# Patient Record
Sex: FEMALE | Race: OTHER | HISPANIC OR LATINO
[De-identification: names, ages, dates, MRNs, and addresses within clinical notes are randomized per-mention and may not be internally consistent; named-entity substitution may affect disease eponyms.]

---

## 2021-06-11 ENCOUNTER — APPOINTMENT (OUTPATIENT)
Dept: INTERNAL MEDICINE | Facility: CLINIC | Age: 61
End: 2021-06-11

## 2022-05-11 ENCOUNTER — RESULT REVIEW (OUTPATIENT)
Age: 62
End: 2022-05-11

## 2022-05-11 ENCOUNTER — APPOINTMENT (OUTPATIENT)
Dept: INTERNAL MEDICINE | Facility: CLINIC | Age: 62
End: 2022-05-11
Payer: COMMERCIAL

## 2022-05-11 ENCOUNTER — OUTPATIENT (OUTPATIENT)
Dept: OUTPATIENT SERVICES | Facility: HOSPITAL | Age: 62
LOS: 1 days | End: 2022-05-11
Payer: SELF-PAY

## 2022-05-11 VITALS
HEART RATE: 109 BPM | OXYGEN SATURATION: 98 % | HEIGHT: 58 IN | WEIGHT: 118 LBS | SYSTOLIC BLOOD PRESSURE: 140 MMHG | BODY MASS INDEX: 24.77 KG/M2 | DIASTOLIC BLOOD PRESSURE: 80 MMHG

## 2022-05-11 DIAGNOSIS — Z80.41 FAMILY HISTORY OF MALIGNANT NEOPLASM OF OVARY: ICD-10-CM

## 2022-05-11 DIAGNOSIS — Z78.9 OTHER SPECIFIED HEALTH STATUS: ICD-10-CM

## 2022-05-11 DIAGNOSIS — I10 ESSENTIAL (PRIMARY) HYPERTENSION: ICD-10-CM

## 2022-05-11 PROCEDURE — ZZZZZ: CPT

## 2022-05-12 NOTE — HISTORY OF PRESENT ILLNESS
[Time Spent: ____ minutes] : Total time spent using  services: [unfilled] minutes. The patient's primary language is not English thus required  services. [FreeTextEntry1] : CPE, new patient [Interpreters_IDNumber] : [id number] [de-identified] : 61 year old female, new to our clinic, presenting for CPE. \par \par #Cough:                   \par -history of multiple "lung infections". Feels wheezy at night, a lot of dry coughing for about 3 weeks.  Most recently went to [facility] last week, told she has bronchitis, given albuterol inhaler, prednisone and Flonase which helped but she ran out yesterday and the cough returned. Has also tried allegra and zyrtec but stopped because felt they didn't help- tried for 10 days each. Also endorsing red itchy eyes with discharge.                    \par PSH: bunion removal, varicose veins removal\par Meds: glucosamine, vitamins, collagen \par FH: mother passed away of ovarian cancer \par Social: \par -Came from [place] 3 years ago\par -works as . \par -lives with daughter everything good at home \par \par PHQ2=0, denies any depression or anxiety \par \par #HCM\par -Mammogram: 10 years ago, was normal \par -Pap:does not remember ever having \par -Covid: vaccinated x3\par -Tdap: unsure if she ever got. \par Does not have record of childhood vaccinates but believes she had them as child a [place].

## 2022-05-12 NOTE — END OF VISIT
[] : Resident [FreeTextEntry3] : 61yop F with PMHx of "recurrent lung infection" who presents to establish care. Developed allergic-type symptoms followed by cough. Was seen at an urgent Care and given albuterol inhaler and prednisone burst with some improvement, however has finished prednisone and has a persistent cough. Exam notable for CTAB, although note brief wheeze at the end of coughing. Suspect reactive airway disease, cont albuterol. Agree with pulm evaluation. BP high on this visit but actively coughing on repeat. Recommend close follow-up for BP

## 2022-05-12 NOTE — REVIEW OF SYSTEMS
[Redness] : redness [Itching] : itching [Nasal Discharge] : nasal discharge [Postnasal Drip] : postnasal drip [Wheezing] : wheezing [Cough] : cough [Negative] : Heme/Lymph [Vision Problems] : no vision problems [Earache] : no earache [Hearing Loss] : no hearing loss [Sore Throat] : no sore throat [Shortness Of Breath] : no shortness of breath [Dyspnea on Exertion] : no dyspnea on exertion

## 2022-05-12 NOTE — PLAN
[FreeTextEntry1] : 61 year old female, no significant PMH, presenting as new patient for CPE. \par \par #Cough\par -lungs clear to auscultation, satting well on RA. Likely post-nasal drip/ allergy induced vs reactive airway disease\par -trial of claritin \par -spacer prescribed for albuterol inhaler \par -Mariferseshawn Haque, continue with flonase PRN \par -pulm referral given history of recurrent "lung infections" in [place], may benefit from PFT testing for asthma/copd \par \par #HCM\par -mammogram referral\par -ob/gyn referral for pap \par -FIT test provided\par -BP elevated, repeat 130/80, however, patient coughing and distressed. Will repeat BP check at next visit, consider starting medications if remains elevated. \par -Covid vaccinated x3\par -CBC, CMP, A1C, lipids, Hep C\par -Tdap deferred this visit, will give next visit \par \par RTC in 5 weeks

## 2022-05-12 NOTE — HEALTH RISK ASSESSMENT
[Good] : ~his/her~  mood as  good [Never] : Never [0-4] : 0-4 [No] : No [Never (0 pts)] : Never (0 points) [0] : 2) Feeling down, depressed, or hopeless: Not at all (0) [PHQ-2 Negative - No further assessment needed] : PHQ-2 Negative - No further assessment needed [Patient reported mammogram was normal] : Patient reported mammogram was normal [Patient reported colonoscopy was normal] : Patient reported colonoscopy was normal [With Family] : lives with family [Employed] : employed [Audit-CScore] : 0 [[id number]] : 0 [MammogramDate] : 2010 [ColonoscopyDate] : 2010 [FreeTextEntry2] :

## 2022-05-13 LAB
ALBUMIN SERPL ELPH-MCNC: 4.7 G/DL
ALP BLD-CCNC: 86 U/L
ALT SERPL-CCNC: 10 U/L
ANION GAP SERPL CALC-SCNC: 12 MMOL/L
AST SERPL-CCNC: 28 U/L
BASOPHILS # BLD AUTO: 0.08 K/UL
BASOPHILS NFR BLD AUTO: 1 %
BILIRUB SERPL-MCNC: 0.4 MG/DL
BUN SERPL-MCNC: 17 MG/DL
CALCIUM SERPL-MCNC: 9.6 MG/DL
CHLORIDE SERPL-SCNC: 105 MMOL/L
CHOLEST SERPL-MCNC: 204 MG/DL
CO2 SERPL-SCNC: 26 MMOL/L
CREAT SERPL-MCNC: 0.61 MG/DL
EGFR: 102 ML/MIN/1.73M2
EOSINOPHIL # BLD AUTO: 0.79 K/UL
EOSINOPHIL NFR BLD AUTO: 10.2 %
ESTIMATED AVERAGE GLUCOSE: 114 MG/DL
GLUCOSE SERPL-MCNC: 101 MG/DL
HBA1C MFR BLD HPLC: 5.6 %
HCT VFR BLD CALC: 40.4 %
HCV AB SER QL: NONREACTIVE
HCV S/CO RATIO: 0.07 S/CO
HDLC SERPL-MCNC: 89 MG/DL
HGB BLD-MCNC: 13 G/DL
IMM GRANULOCYTES NFR BLD AUTO: 0.5 %
LDLC SERPL CALC-MCNC: 81 MG/DL
LYMPHOCYTES # BLD AUTO: 3.15 K/UL
LYMPHOCYTES NFR BLD AUTO: 40.9 %
MAN DIFF?: NORMAL
MCHC RBC-ENTMCNC: 31 PG
MCHC RBC-ENTMCNC: 32.2 GM/DL
MCV RBC AUTO: 96.2 FL
MONOCYTES # BLD AUTO: 0.69 K/UL
MONOCYTES NFR BLD AUTO: 8.9 %
NEUTROPHILS # BLD AUTO: 2.96 K/UL
NEUTROPHILS NFR BLD AUTO: 38.5 %
NONHDLC SERPL-MCNC: 114 MG/DL
PLATELET # BLD AUTO: 263 K/UL
POTASSIUM SERPL-SCNC: 4.4 MMOL/L
PROT SERPL-MCNC: 6.8 G/DL
RBC # BLD: 4.2 M/UL
RBC # FLD: 13.2 %
SODIUM SERPL-SCNC: 143 MMOL/L
TRIGL SERPL-MCNC: 169 MG/DL
TSH SERPL-ACNC: 3.33 UIU/ML
WBC # FLD AUTO: 7.71 K/UL

## 2022-05-13 PROCEDURE — T1013: CPT

## 2022-05-13 PROCEDURE — 82274 ASSAY TEST FOR BLOOD FECAL: CPT

## 2022-05-13 PROCEDURE — 84443 ASSAY THYROID STIM HORMONE: CPT

## 2022-05-13 PROCEDURE — 86803 HEPATITIS C AB TEST: CPT

## 2022-05-13 PROCEDURE — 80053 COMPREHEN METABOLIC PANEL: CPT

## 2022-05-13 PROCEDURE — 85025 COMPLETE CBC W/AUTO DIFF WBC: CPT

## 2022-05-13 PROCEDURE — 80061 LIPID PANEL: CPT

## 2022-05-13 PROCEDURE — 83036 HEMOGLOBIN GLYCOSYLATED A1C: CPT

## 2022-05-13 PROCEDURE — G0463: CPT

## 2022-05-16 LAB — HEMOCCULT STL QL IA: NEGATIVE

## 2022-05-17 DIAGNOSIS — H10.10 ACUTE ATOPIC CONJUNCTIVITIS, UNSPECIFIED EYE: ICD-10-CM

## 2022-05-17 DIAGNOSIS — R05.9 COUGH, UNSPECIFIED: ICD-10-CM

## 2022-05-17 DIAGNOSIS — Z78.9 OTHER SPECIFIED HEALTH STATUS: ICD-10-CM

## 2022-05-17 DIAGNOSIS — Z80.41 FAMILY HISTORY OF MALIGNANT NEOPLASM OF OVARY: ICD-10-CM

## 2022-05-17 DIAGNOSIS — Z00.00 ENCOUNTER FOR GENERAL ADULT MEDICAL EXAMINATION WITHOUT ABNORMAL FINDINGS: ICD-10-CM

## 2022-05-18 ENCOUNTER — OUTPATIENT (OUTPATIENT)
Dept: OUTPATIENT SERVICES | Facility: HOSPITAL | Age: 62
LOS: 1 days | End: 2022-05-18
Payer: SELF-PAY

## 2022-05-18 ENCOUNTER — APPOINTMENT (OUTPATIENT)
Dept: INTERNAL MEDICINE | Facility: CLINIC | Age: 62
End: 2022-05-18
Payer: COMMERCIAL

## 2022-05-18 VITALS
TEMPERATURE: 98.7 F | OXYGEN SATURATION: 98 % | HEIGHT: 58 IN | SYSTOLIC BLOOD PRESSURE: 150 MMHG | HEART RATE: 115 BPM | DIASTOLIC BLOOD PRESSURE: 90 MMHG | BODY MASS INDEX: 24.14 KG/M2 | WEIGHT: 115 LBS

## 2022-05-18 DIAGNOSIS — I10 ESSENTIAL (PRIMARY) HYPERTENSION: ICD-10-CM

## 2022-05-18 PROCEDURE — G0463: CPT

## 2022-05-18 PROCEDURE — ZZZZZ: CPT

## 2022-05-19 NOTE — REVIEW OF SYSTEMS
[Fatigue] : fatigue [Redness] : redness [Shortness Of Breath] : shortness of breath [Wheezing] : wheezing [Cough] : cough [Negative] : Neurological

## 2022-05-20 NOTE — END OF VISIT
[] : Resident [FreeTextEntry3] : 62yo F who presents for recurrent cough and worsened sx since trialing allergic rhinitis tx and cough suppressant last week. Seen last week with reassuring lung exam, resident reports this week is diffusely wheezy again. Agree with restarting prednisone, favor longer taper given severeity of sx and failure of burst. Will trial ICS-LABA BID but if unable to obtain due to cost, can consider [facility]elukast through [facility]

## 2022-05-20 NOTE — HISTORY OF PRESENT ILLNESS
[FreeTextEntry8] :  [id number] used for patient interaction.\par \par 62 y/o F w/ h/o multiple“lung infections”, seen last week for likely asthma exacerbation presents for continued symptoms.\par \par Symptoms initially started 3 weeks ago w/ dry cough and wheezing. Seen at urgent care and given course of prednisone. Patient states that the steroids helped her symptoms, but did not resolve the symptoms. After 5 day 40mg prednisone course finished, symptoms worsened. \par \par Seen last week at clinic for cough, wheezing, shortness of breath after being seen and given claritin, albuterol w/ spacer, tesselon perles, flonase. S/p course of prednisone from urgent care. Additionally referred to pulmonology, next available pulm appt in July which patient has scheduled.\par   \par Currently patient uses inhaler every 4 hours. Relives symptoms for a few hours before she becomes “suffocated”. Significant coughing with "chest whistling" at night where patient states her chest becomes "blocked." These symptoms prevent her from sleeping and she has been significantly fatigued since her symptoms have started.\par \par Bronchitis/lung infection diagnosed by urgent care in past, given antibioticsfor infection in the last year and symptoms resolved completely after treatment.\par \par Has tried allegra and zyrtec but stopped as they did not help. \par \par Also complaining of some heartburn which she has been taking OTC esomeprazole, helps symptoms.

## 2022-05-20 NOTE — PHYSICAL EXAM
[Normal] : soft, non-tender, non-distended, no masses palpated, no HSM and normal bowel sounds [de-identified] : b/l mild conjunctivl erythema and tearing [de-identified] : diffuse inspiratory/expiratory wheezing, mildly increased WOB, frequent dry cough [de-identified] : tachycardia, no Murmurs, rubs, gallops

## 2022-05-20 NOTE — ASSESSMENT
[FreeTextEntry1] : 60 y/o F w/ h/o multiple“lung infections”, seen last week for likely asthma exacerbation presents for continued symptoms.

## 2022-05-21 ENCOUNTER — OUTPATIENT (OUTPATIENT)
Dept: OUTPATIENT SERVICES | Facility: HOSPITAL | Age: 62
LOS: 1 days | End: 2022-05-21
Payer: SELF-PAY

## 2022-05-21 ENCOUNTER — RESULT REVIEW (OUTPATIENT)
Age: 62
End: 2022-05-21

## 2022-05-21 ENCOUNTER — APPOINTMENT (OUTPATIENT)
Dept: MAMMOGRAPHY | Facility: IMAGING CENTER | Age: 62
End: 2022-05-21
Payer: COMMERCIAL

## 2022-05-21 DIAGNOSIS — Z00.8 ENCOUNTER FOR OTHER GENERAL EXAMINATION: ICD-10-CM

## 2022-05-21 PROCEDURE — 77063 BREAST TOMOSYNTHESIS BI: CPT | Mod: 26

## 2022-05-21 PROCEDURE — 77067 SCR MAMMO BI INCL CAD: CPT

## 2022-05-21 PROCEDURE — 77067 SCR MAMMO BI INCL CAD: CPT | Mod: 26

## 2022-05-21 PROCEDURE — 77063 BREAST TOMOSYNTHESIS BI: CPT

## 2022-05-26 ENCOUNTER — NON-APPOINTMENT (OUTPATIENT)
Age: 62
End: 2022-05-26

## 2022-05-26 DIAGNOSIS — H10.10 ACUTE ATOPIC CONJUNCTIVITIS, UNSPECIFIED EYE: ICD-10-CM

## 2022-05-26 DIAGNOSIS — R05.9 COUGH, UNSPECIFIED: ICD-10-CM

## 2022-06-14 ENCOUNTER — OUTPATIENT (OUTPATIENT)
Dept: OUTPATIENT SERVICES | Facility: HOSPITAL | Age: 62
LOS: 1 days | End: 2022-06-14
Payer: SELF-PAY

## 2022-06-14 ENCOUNTER — APPOINTMENT (OUTPATIENT)
Dept: INTERNAL MEDICINE | Facility: CLINIC | Age: 62
End: 2022-06-14
Payer: COMMERCIAL

## 2022-06-14 VITALS
WEIGHT: 119 LBS | SYSTOLIC BLOOD PRESSURE: 128 MMHG | OXYGEN SATURATION: 98 % | BODY MASS INDEX: 24.98 KG/M2 | HEART RATE: 82 BPM | DIASTOLIC BLOOD PRESSURE: 78 MMHG | HEIGHT: 58 IN

## 2022-06-14 DIAGNOSIS — I10 ESSENTIAL (PRIMARY) HYPERTENSION: ICD-10-CM

## 2022-06-14 DIAGNOSIS — H10.10 ACUTE ATOPIC CONJUNCTIVITIS, UNSPECIFIED EYE: ICD-10-CM

## 2022-06-14 PROCEDURE — T1013: CPT

## 2022-06-14 PROCEDURE — G0463: CPT | Mod: 25

## 2022-06-14 PROCEDURE — ZZZZZ: CPT

## 2022-06-17 ENCOUNTER — OUTPATIENT (OUTPATIENT)
Dept: OUTPATIENT SERVICES | Facility: HOSPITAL | Age: 62
LOS: 1 days | End: 2022-06-17
Payer: SELF-PAY

## 2022-06-17 ENCOUNTER — RESULT REVIEW (OUTPATIENT)
Age: 62
End: 2022-06-17

## 2022-06-17 ENCOUNTER — APPOINTMENT (OUTPATIENT)
Dept: MAMMOGRAPHY | Facility: IMAGING CENTER | Age: 62
End: 2022-06-17
Payer: COMMERCIAL

## 2022-06-17 ENCOUNTER — APPOINTMENT (OUTPATIENT)
Dept: ULTRASOUND IMAGING | Facility: IMAGING CENTER | Age: 62
End: 2022-06-17
Payer: COMMERCIAL

## 2022-06-17 DIAGNOSIS — Z00.8 ENCOUNTER FOR OTHER GENERAL EXAMINATION: ICD-10-CM

## 2022-06-17 DIAGNOSIS — R92.8 OTHER ABNORMAL AND INCONCLUSIVE FINDINGS ON DIAGNOSTIC IMAGING OF BREAST: ICD-10-CM

## 2022-06-17 PROCEDURE — 76642 ULTRASOUND BREAST LIMITED: CPT

## 2022-06-17 PROCEDURE — G0279: CPT

## 2022-06-17 PROCEDURE — 76642 ULTRASOUND BREAST LIMITED: CPT | Mod: 26,LT

## 2022-06-17 PROCEDURE — 77065 DX MAMMO INCL CAD UNI: CPT | Mod: 26,LT

## 2022-06-17 PROCEDURE — 77065 DX MAMMO INCL CAD UNI: CPT

## 2022-06-17 PROCEDURE — G0279: CPT | Mod: 26

## 2022-06-17 NOTE — HISTORY OF PRESENT ILLNESS
[[employer] ] : provided by [employer]   [Time Spent: ____ minutes] : Total time spent using  services: [unfilled] minutes. The patient's primary language is not English thus required  services. [FreeTextEntry1] : FU [Interpreters_IDNumber] : [id number] [de-identified] : Ms. Solares is a 61 year old female presenting for FU. I last saw her in May, at that time she was endorsing history of "multiple lung infections" in the past. She was started on claritin, albuterol, tessalon and flonase at the time. Her symptoms persisted, and she was started on a steroid taper in May 18th, as well as fluticasone salmeterol. \par \par Today, she is feeling a lot better. Today is last day of steroid taper. She has been using the new inhaler twice a day, feels that it is really helping. Ran out of albuterol awhile ago. Has also been taking the loratidine. No SOB, cough, fevers, chest pain.

## 2022-06-17 NOTE — ASSESSMENT
[FreeTextEntry1] : 61 year old female presenting for FU. \par \par #Cough\par -much improved with inhalers, steroids and loratidine. Today is last day of steroid taper \par -has pulm appointment in July, will need PFTs for reactive airway disease \par -continue albuterol and advair- will task pharmacist about obtaining less expensive steroid/LABA as costing patient >100 dollars.

## 2022-06-20 ENCOUNTER — LABORATORY RESULT (OUTPATIENT)
Age: 62
End: 2022-06-20

## 2022-06-20 ENCOUNTER — OUTPATIENT (OUTPATIENT)
Dept: OUTPATIENT SERVICES | Facility: HOSPITAL | Age: 62
LOS: 1 days | End: 2022-06-20
Payer: SELF-PAY

## 2022-06-20 ENCOUNTER — APPOINTMENT (OUTPATIENT)
Dept: OBGYN | Facility: CLINIC | Age: 62
End: 2022-06-20
Payer: COMMERCIAL

## 2022-06-20 VITALS
WEIGHT: 122.13 LBS | BODY MASS INDEX: 25.52 KG/M2 | SYSTOLIC BLOOD PRESSURE: 142 MMHG | DIASTOLIC BLOOD PRESSURE: 78 MMHG

## 2022-06-20 DIAGNOSIS — N76.0 ACUTE VAGINITIS: ICD-10-CM

## 2022-06-20 DIAGNOSIS — Z12.9 ENCOUNTER FOR SCREENING FOR MALIGNANT NEOPLASM, SITE UNSPECIFIED: ICD-10-CM

## 2022-06-20 DIAGNOSIS — Z01.419 ENCOUNTER FOR GYNECOLOGICAL EXAMINATION (GENERAL) (ROUTINE) W/OUT ABNORMAL FINDINGS: ICD-10-CM

## 2022-06-20 DIAGNOSIS — R10.2 PELVIC AND PERINEAL PAIN: ICD-10-CM

## 2022-06-20 PROCEDURE — 87625 HPV TYPES 16 & 18 ONLY: CPT

## 2022-06-20 PROCEDURE — 87624 HPV HI-RISK TYP POOLED RSLT: CPT

## 2022-06-20 PROCEDURE — T1013: CPT

## 2022-06-20 PROCEDURE — 99203 OFFICE O/P NEW LOW 30 MIN: CPT | Mod: 25

## 2022-06-20 NOTE — REASON FOR VISIT
[Initial] : an initial consultation for [[employer] ] : provided by [employer]   [Time Spent: ____ minutes] : Total time spent using  services: [unfilled] minutes. The patient's primary language is not English thus required  services. [Interpreters_IDNumber] : [id number]

## 2022-06-20 NOTE — PHYSICAL EXAM
[Appropriately responsive] : appropriately responsive [Alert] : alert [No Acute Distress] : no acute distress [No Lymphadenopathy] : no lymphadenopathy [No Murmurs] : no murmurs [Soft] : soft [Non-tender] : non-tender [Non-distended] : non-distended [No HSM] : No HSM [No Lesions] : no lesions [No Mass] : no mass [Oriented x3] : oriented x3 [Examination Of The Breasts] : a normal appearance [No Masses] : no breast masses were palpable [Vulvar Atrophy] : vulvar atrophy [Labia Majora] : normal [Labia Minora] : normal [Atrophy] : atrophy [No Bleeding] : There was no active vaginal bleeding [Normal] : normal [Tenderness] : nontender [Enlarged ___ wks] : not enlarged [Uterine Adnexae] : non-palpable

## 2022-06-20 NOTE — HISTORY OF PRESENT ILLNESS
[FreeTextEntry1] : INT# [id number] \par 62yo  (LMP 49) presents as new pt to establish care. Denies PMB/pain. change in bowel/bladder habits.  \par Pt is not sexually active.  Denies stress incontinence.  + vague bloating/ lower abdominal pain. \par Pt had recent mammo-  requiring biopsy, scheduled for Friday per pt. \par \par Obhx: 3x ,  1 x sab (D&C)\par Gynhx: denies abnl paps/ stds/cysts/fibroids. Last pap:  ??  Mammo: 2022 Bir[facility] 4A (pt has bx scheduled)\par Pmhx: chronic cough? - following up with Pulmonology in July\par Shx: bunion removal, varicose veins removal, D&C\par Meds: glucosamine, vitamins, collagen \par Famhx: mother passed away of ovarian cancer (50s), brother passed away throat cancer (60s)\par Sochx: +smoking hx (4yr hx in teenage hx) denies etoh.drugs, denies dv or abuse issues\par Lives with daughter.  Immigrant from [place] x 3 yrs. \par \par Gen health followed by Medicine. \par - Colonoscopy: ([facility])- 42yo.  \par - COVID:

## 2022-06-20 NOTE — DISCUSSION/SUMMARY
[FreeTextEntry1] : 62yo P3-  new pt / annual exam\par \par # bloating/ vague pelvic pain\par - in setting of fam h/o ovarian cancer  [ ]pelvic sono ordered\par - reviewed fam hx [ ]medical gentics referral\par \par # HCM\par - []pap/ hpv collected\par - [facility] 4 mammo- [ ]pt has biopsy schedule \par - GI referral given for screening colonoscopy\par - gen health followed by medicine\par \par RTC for lubna/prn\par Will call with sono result\par Malini Salazar, PAC

## 2022-06-21 LAB
HPV GENOTYPE 16: SIGNIFICANT CHANGE UP
HPV GENOTYPE 18/45: SIGNIFICANT CHANGE UP
HPV HIGH+LOW RISK DNA PNL CVX: DETECTED

## 2022-06-22 DIAGNOSIS — R05.9 COUGH, UNSPECIFIED: ICD-10-CM

## 2022-06-23 LAB — CYTOLOGY SPEC DOC CYTO: SIGNIFICANT CHANGE UP

## 2022-06-24 ENCOUNTER — RESULT REVIEW (OUTPATIENT)
Age: 62
End: 2022-06-24

## 2022-06-24 ENCOUNTER — OUTPATIENT (OUTPATIENT)
Dept: OUTPATIENT SERVICES | Facility: HOSPITAL | Age: 62
LOS: 1 days | End: 2022-06-24
Payer: SELF-PAY

## 2022-06-24 ENCOUNTER — APPOINTMENT (OUTPATIENT)
Dept: ULTRASOUND IMAGING | Facility: IMAGING CENTER | Age: 62
End: 2022-06-24
Payer: COMMERCIAL

## 2022-06-24 DIAGNOSIS — R92.8 OTHER ABNORMAL AND INCONCLUSIVE FINDINGS ON DIAGNOSTIC IMAGING OF BREAST: ICD-10-CM

## 2022-06-24 PROCEDURE — A4648: CPT

## 2022-06-24 PROCEDURE — 19083 BX BREAST 1ST LESION US IMAG: CPT

## 2022-06-24 PROCEDURE — 88305 TISSUE EXAM BY PATHOLOGIST: CPT | Mod: 26

## 2022-06-24 PROCEDURE — 77065 DX MAMMO INCL CAD UNI: CPT | Mod: 26,LT

## 2022-06-24 PROCEDURE — 77065 DX MAMMO INCL CAD UNI: CPT

## 2022-06-24 PROCEDURE — 88305 TISSUE EXAM BY PATHOLOGIST: CPT

## 2022-06-24 PROCEDURE — 19083 BX BREAST 1ST LESION US IMAG: CPT | Mod: LT

## 2022-06-28 DIAGNOSIS — Z01.419 ENCOUNTER FOR GYNECOLOGICAL EXAMINATION (GENERAL) (ROUTINE) WITHOUT ABNORMAL FINDINGS: ICD-10-CM

## 2022-06-28 DIAGNOSIS — Z12.9 ENCOUNTER FOR SCREENING FOR MALIGNANT NEOPLASM, SITE UNSPECIFIED: ICD-10-CM

## 2022-06-28 DIAGNOSIS — R10.2 PELVIC AND PERINEAL PAIN: ICD-10-CM

## 2022-07-01 ENCOUNTER — NON-APPOINTMENT (OUTPATIENT)
Age: 62
End: 2022-07-01

## 2022-07-03 ENCOUNTER — NON-APPOINTMENT (OUTPATIENT)
Age: 62
End: 2022-07-03

## 2022-07-06 ENCOUNTER — APPOINTMENT (OUTPATIENT)
Dept: PULMONOLOGY | Facility: CLINIC | Age: 62
End: 2022-07-06

## 2022-07-06 VITALS
HEART RATE: 67 BPM | BODY MASS INDEX: 24.39 KG/M2 | TEMPERATURE: 98.5 F | WEIGHT: 121 LBS | OXYGEN SATURATION: 99 % | DIASTOLIC BLOOD PRESSURE: 78 MMHG | SYSTOLIC BLOOD PRESSURE: 132 MMHG | HEIGHT: 59 IN

## 2022-07-06 PROCEDURE — 94729 DIFFUSING CAPACITY: CPT

## 2022-07-06 PROCEDURE — 99202 OFFICE O/P NEW SF 15 MIN: CPT | Mod: 25

## 2022-07-06 PROCEDURE — ZZZZZ: CPT

## 2022-07-06 PROCEDURE — 94726 PLETHYSMOGRAPHY LUNG VOLUMES: CPT

## 2022-07-06 PROCEDURE — 94010 BREATHING CAPACITY TEST: CPT

## 2022-07-06 NOTE — ASSESSMENT
[FreeTextEntry1] : the patient reports that she had a chest radiograph which she's not certain of the results. She did have a mild eosinophilia when she was seen in May. Her lung functions show very mild obstructive airways disease but because we did not know that she had possible history of asthma and she did not bring her bronchodilator inhaler, no test for bronchodilatation was performed.\par . Apparently the cost of inhalers is prohibitive for her. I told her that if any of her symptoms were to return, wheezing, coughing or shortness of breath, she should contact me and I would be happy to provide her with medication samples.\par

## 2022-07-06 NOTE — HISTORY OF PRESENT ILLNESS
[TextBox_4] : 61-year-old female not certain why she was being seen by a pulmonary physician. I was helped during the interview by a  who spoke Sierra Leonean. The patient reports that she was sick about a month ago with a respiratory ailment that she thought was bronchitis but now she is better. She failed to mention that she had been taking inhaled Advair and albuterol and was given a course of corticosteroids. Currently she is not taking any medications since she can't afford them. She has a history of having had Covid about a year ago. She was born in [place] and denies any prior history of pulmonary disease.. The patient works as a  and she denies any smoking history

## 2022-07-09 ENCOUNTER — APPOINTMENT (OUTPATIENT)
Dept: RADIOLOGY | Facility: CLINIC | Age: 62
End: 2022-07-09

## 2022-07-09 ENCOUNTER — OUTPATIENT (OUTPATIENT)
Dept: OUTPATIENT SERVICES | Facility: HOSPITAL | Age: 62
LOS: 1 days | End: 2022-07-09
Payer: SELF-PAY

## 2022-07-09 DIAGNOSIS — J18.9 PNEUMONIA, UNSPECIFIED ORGANISM: ICD-10-CM

## 2022-07-09 DIAGNOSIS — Z12.9 ENCOUNTER FOR SCREENING FOR MALIGNANT NEOPLASM, SITE UNSPECIFIED: ICD-10-CM

## 2022-07-09 DIAGNOSIS — Z01.419 ENCOUNTER FOR GYNECOLOGICAL EXAMINATION (GENERAL) (ROUTINE) WITHOUT ABNORMAL FINDINGS: ICD-10-CM

## 2022-07-09 DIAGNOSIS — R10.2 PELVIC AND PERINEAL PAIN: ICD-10-CM

## 2022-07-09 PROCEDURE — 71046 X-RAY EXAM CHEST 2 VIEWS: CPT

## 2022-07-09 PROCEDURE — 71046 X-RAY EXAM CHEST 2 VIEWS: CPT | Mod: 26

## 2022-08-01 ENCOUNTER — NON-APPOINTMENT (OUTPATIENT)
Age: 62
End: 2022-08-01

## 2022-08-03 ENCOUNTER — APPOINTMENT (OUTPATIENT)
Dept: PULMONOLOGY | Facility: CLINIC | Age: 62
End: 2022-08-03

## 2022-08-03 VITALS
TEMPERATURE: 98 F | WEIGHT: 121 LBS | HEART RATE: 65 BPM | HEIGHT: 59 IN | RESPIRATION RATE: 15 BRPM | BODY MASS INDEX: 24.39 KG/M2 | SYSTOLIC BLOOD PRESSURE: 146 MMHG | OXYGEN SATURATION: 95 % | DIASTOLIC BLOOD PRESSURE: 71 MMHG

## 2022-08-03 PROCEDURE — 99213 OFFICE O/P EST LOW 20 MIN: CPT | Mod: GC

## 2022-08-03 NOTE — ASSESSMENT
[FreeTextEntry1] : 60 yo F with PMH of cough and ?prior history of asthma who presents for evaluation of prior cough. Patient has been doing well off of inhaler therapy, prior CXR and PFTs unremarkable without cough or other respiratory symptoms at this time.\par \par Plan:\par - no need for inhaler therapy at this time\par - advised patient to return to pulmonary office if she has any further cough, shortness of breath, or wheezing, we will provide medication samples\par \par \par \par Follow up as needed.

## 2022-08-03 NOTE — PHYSICAL EXAM
[No Acute Distress] : no acute distress [Normal Oropharynx] : normal oropharynx [Normal Appearance] : normal appearance [No Neck Mass] : no neck mass [Normal Rate/Rhythm] : normal rate/rhythm [Normal S1, S2] : normal s1, s2 [No Murmurs] : no murmurs [No Resp Distress] : no resp distress [Clear to Auscultation Bilaterally] : clear to auscultation bilaterally [No Abnormalities] : no abnormalities [Normal Gait] : normal gait [No Clubbing] : no clubbing [No Cyanosis] : no cyanosis [No Edema] : no edema [FROM] : FROM [Normal Color/ Pigmentation] : normal color/ pigmentation [No Focal Deficits] : no focal deficits [Normal Affect] : normal affect [Oriented x3] : oriented x3

## 2022-08-03 NOTE — HISTORY OF PRESENT ILLNESS
[Former] : former [TextBox_4] : [employer]  Cuban: [id number]\par \par 61 year old female with history of chronic cough and possible history of asthma who presents for follow up. She was last seen by pulmonary about 1 month ago. At that time, she was not sure why she was seeing a pulmonologist. Reports that since last visit, she had an upper respiratory infection but she has no issues with shortness of breath and did not need any inhaler therapy (in may 2022 she was prescribed prednisone and symbicort for wheezing while she had a URI). She has not been able to use the inhaler prescribed by her primary doctor because it is not covered by her insurance. Currently, she has no cough, shortness of breath, wheezing, chest pain, chest tightness, acid reflux. No recent travel or sick contacts.\par  [TextBox_11] : 1/3 [TextBox_13] : 7 [YearQuit] : 1980

## 2022-08-22 ENCOUNTER — OUTPATIENT (OUTPATIENT)
Dept: OUTPATIENT SERVICES | Facility: HOSPITAL | Age: 62
LOS: 1 days | Discharge: HOME | End: 2022-08-22

## 2022-08-22 DIAGNOSIS — Z15.89 GENETIC SUSCEPTIBILITY TO OTHER DISEASE: ICD-10-CM

## 2022-08-24 ENCOUNTER — LABORATORY RESULT (OUTPATIENT)
Age: 62
End: 2022-08-24

## 2022-08-24 ENCOUNTER — APPOINTMENT (OUTPATIENT)
Dept: HEMATOLOGY ONCOLOGY | Facility: CLINIC | Age: 62
End: 2022-08-24

## 2022-08-30 NOTE — DISCUSSION/SUMMARY
[FreeTextEntry1] : REASON FOR CONSULT\par Desire Solares is a 61-year-old female who was referred by Dr. Jon Duron for cancer genetic counseling and risk assessment due to a family history of ovarian cancer. This consultation was carried out with the aid of a  #[id number] and [id number]. \par \par RELEVANT MEDICAL HISTORY\par Ms. Solares is a healthy individual who has never had cancer. She has a family history of cancer, see below.\par \par OTHER MEDICAL AND SURGICAL HISTORY:\par Arthritis. Vein surgery. Bunions. Left breast biopsy – breast tissue with fibroadenomatoid change (2022). \par \par PAST OB/GYN HISTORY:\par Obstetrical History: \par Age at Menarche: 15\par Menopausal with LMP at age 53\par Age at First Live Birth: 20\par Oral Contraceptive Use: No\par Hormone Replacement Therapy: No\par \par CANCER SCREENING HISTORY:  \par Breast: Last mammogram and sonogram 2022, scattered areas of fibroglandular density and left breast mass, needle biopsy pursued, see above for results. Frequency: yearly. \par GYN: Last visit 3 months ago, normal. Frequency: yearly. \par Colon: Last colonoscopy 10 years ago, no polyps reported.\par Skin: None. \par \par SOCIAL HISTORY:\par •	Tobacco-product use: Yes, formerly, 3 cigarettes per day for 2 years. \par \par FAMILY HISTORY:\par Maternal and paternal ancestry was reported as [place]. No Ashkenazi Holiness heritage reported. A detailed family history of cancer was ascertained, see below and scanned chart for pedigree. \par \par To Ms. Solares’s knowledge, no one in the family has had germline testing for cancer susceptibility.  \par 	\par 	RISK ASSESSMENT:\par Ms. Solares’s family history of ovarian cancer is suggestive of more than one hereditary cancer predisposition syndrome. We recommended genetic testing for a guidelines-based breast and gyn cancers panel. This test analyzes 19 genes: JIM, BARD1, BRCA1, BRCA2, BRIP1, CDH1, CHEK2, EPCAM, MLH1, MSH2, MSH6, NF1, PALB2, PMS2, PTEN, RAD51C, RAD51D, STK11, TP53.\par \par We discussed the risks, benefits and limitations, and implications of genetic testing. We also discussed the psychosocial implications of genetic testing. Possible test results were reviewed with Ms. Solares, along with associated medical management options. The Genetic Information Non-discrimination Act ([place]) was also reviewed.\par \par Ms. Solares consented to the above-mentioned genetic testing panel. Blood was drawn in our laboratory and sent to [facility] today.\par \par PLAN:\par \par 1.	Blood drawn today will be sent to [facility] for analysis. \par 2.	We will contact Ms. Solares to schedule a follow-up appointment once the results are available. Results generally return in 2-3 weeks. \par \par \par For any additional questions please call Cancer Genetics at [phone]. \par \par \par Kj Newton, MS, [facility]\par Genetic Counselor, Cancer Genetics\par \par Laurie Santana\par Genetic Counseling Intern\par \par CC: \par Dr. Jon Duron

## 2022-10-07 ENCOUNTER — NON-APPOINTMENT (OUTPATIENT)
Age: 62
End: 2022-10-07

## 2022-10-14 ENCOUNTER — OUTPATIENT (OUTPATIENT)
Dept: OUTPATIENT SERVICES | Facility: HOSPITAL | Age: 62
LOS: 1 days | End: 2022-10-14
Payer: SELF-PAY

## 2022-10-14 ENCOUNTER — MED ADMIN CHARGE (OUTPATIENT)
Age: 62
End: 2022-10-14

## 2022-10-14 ENCOUNTER — APPOINTMENT (OUTPATIENT)
Dept: INTERNAL MEDICINE | Facility: CLINIC | Age: 62
End: 2022-10-14

## 2022-10-14 VITALS
HEART RATE: 79 BPM | BODY MASS INDEX: 23.99 KG/M2 | SYSTOLIC BLOOD PRESSURE: 124 MMHG | WEIGHT: 119 LBS | OXYGEN SATURATION: 98 % | HEIGHT: 59 IN | DIASTOLIC BLOOD PRESSURE: 62 MMHG

## 2022-10-14 DIAGNOSIS — Z23 ENCOUNTER FOR IMMUNIZATION: ICD-10-CM

## 2022-10-14 DIAGNOSIS — Z00.00 ENCOUNTER FOR GENERAL ADULT MEDICAL EXAMINATION W/OUT ABNORMAL FINDINGS: ICD-10-CM

## 2022-10-14 DIAGNOSIS — I10 ESSENTIAL (PRIMARY) HYPERTENSION: ICD-10-CM

## 2022-10-14 LAB
ALBUMIN SERPL ELPH-MCNC: 4.7 G/DL
ALP BLD-CCNC: 92 U/L
ALT SERPL-CCNC: 8 U/L
ANION GAP SERPL CALC-SCNC: 12 MMOL/L
AST SERPL-CCNC: 22 U/L
BILIRUB SERPL-MCNC: 0.7 MG/DL
BUN SERPL-MCNC: 23 MG/DL
CALCIUM SERPL-MCNC: 9.9 MG/DL
CHLORIDE SERPL-SCNC: 105 MMOL/L
CO2 SERPL-SCNC: 26 MMOL/L
CREAT SERPL-MCNC: 0.62 MG/DL
CRP SERPL-MCNC: <3 MG/L
EGFR: 102 ML/MIN/1.73M2
GLUCOSE SERPL-MCNC: 95 MG/DL
POTASSIUM SERPL-SCNC: 4.6 MMOL/L
PROT SERPL-MCNC: 6.7 G/DL
SODIUM SERPL-SCNC: 142 MMOL/L
URATE SERPL-MCNC: 6.3 MG/DL

## 2022-10-14 PROCEDURE — 90688 IIV4 VACCINE SPLT 0.5 ML IM: CPT

## 2022-10-14 PROCEDURE — 80053 COMPREHEN METABOLIC PANEL: CPT

## 2022-10-14 PROCEDURE — G0463: CPT | Mod: 25

## 2022-10-14 PROCEDURE — 84550 ASSAY OF BLOOD/URIC ACID: CPT

## 2022-10-14 PROCEDURE — ZZZZZ: CPT

## 2022-10-14 PROCEDURE — 86140 C-REACTIVE PROTEIN: CPT

## 2022-10-14 PROCEDURE — 86431 RHEUMATOID FACTOR QUANT: CPT

## 2022-10-14 PROCEDURE — G0008: CPT

## 2022-10-14 PROCEDURE — 85652 RBC SED RATE AUTOMATED: CPT

## 2022-10-14 PROCEDURE — 81001 URINALYSIS AUTO W/SCOPE: CPT

## 2022-10-14 PROCEDURE — 86038 ANTINUCLEAR ANTIBODIES: CPT

## 2022-10-14 RX ORDER — DICLOFENAC SODIUM 1% 10 MG/G
1 GEL TOPICAL TWICE DAILY
Qty: 2 | Refills: 0 | Status: ACTIVE | COMMUNITY
Start: 2022-10-14

## 2022-10-14 NOTE — REVIEW OF SYSTEMS
[Joint Pain] : joint pain [Joint Stiffness] : joint stiffness [Joint Swelling] : joint swelling [Negative] : Genitourinary [Fever] : no fever [Chills] : no chills [Fatigue] : no fatigue [Recent Change In Weight] : ~T no recent weight change [Chest Pain] : no chest pain [Palpitations] : no palpitations [Shortness Of Breath] : no shortness of breath [Wheezing] : no wheezing [Cough] : no cough [Abdominal Pain] : no abdominal pain [Constipation] : no constipation [Heartburn] : no heartburn [Melena] : no melena

## 2022-10-14 NOTE — PHYSICAL EXAM
[Normal] : normal rate, regular rhythm, normal S1 and S2 and no murmur heard [Soft] : abdomen soft [Non Tender] : non-tender [Normal Bowel Sounds] : normal bowel sounds [No Rash] : no rash [de-identified] : MCP joints appear swollen b/l; some left hand PIP joints demonstrating ulnar deviations

## 2022-10-14 NOTE — HISTORY OF PRESENT ILLNESS
[FreeTextEntry1] : f/u [de-identified] : 61F w/PMHx of chronic cough and asthma presenting for f/u\par \par #Joint pain\par -started 7 yrs ago \par -b/l thumb and hand metatarsals more notable on the L, and R shoulder \par -+stiffness in the morning >30mins, improves with movement likely 2/2 inflammatory arthritis and again flares at night in PM\par -no rash\par -no f/c, abd pain, diarrhea, hematochezia/melena\par -f/u RF, anti-citrullinated peptide antibodies, ESR, CRP, JOHNNY, CBC, CMP, uric acid, and UA\par -voltaren until dx confirmed, in addition patient can use tylenol\par \par #HCM\par -Flu and tdap today\par -COVID s/p 3 shots\par -shingles will obtain in pharmacy \par

## 2022-10-14 NOTE — ASSESSMENT
[FreeTextEntry1] : 61F w/PMHx of chronic cough and asthma presenting for f/u\par \par #HCM\par -Flu and tdap today\par -COVID s/p 3 shots\par -shingles will obtain in pharmacy \par \par \par RTC s/p xray and rheum f/u for likely inflammatory arthritis\par \par d/w Dr. Pabon\par

## 2022-10-14 NOTE — INTERPRETER SERVICES
[[employer] ] : provided by [employer]   [Interpreters_IDNumber] : [id number] [Interpreters_FullName] : Rashaun

## 2022-10-14 NOTE — HEALTH RISK ASSESSMENT
[0] : 2) Feeling down, depressed, or hopeless: Not at all (0) [PHQ-2 Negative - No further assessment needed] : PHQ-2 Negative - No further assessment needed [[id number]] : 0

## 2022-10-17 LAB
ANA SER IF-ACNC: NEGATIVE
APPEARANCE: CLEAR
BACTERIA: NEGATIVE
BILIRUBIN URINE: NEGATIVE
BLOOD URINE: NEGATIVE
COLOR: NORMAL
ERYTHROCYTE [SEDIMENTATION RATE] IN BLOOD BY WESTERGREN METHOD: 3 MM/HR
GLUCOSE QUALITATIVE U: NEGATIVE
HYALINE CASTS: 0 /LPF
KETONES URINE: NEGATIVE
LEUKOCYTE ESTERASE URINE: NEGATIVE
MICROSCOPIC-UA: NORMAL
NITRITE URINE: NEGATIVE
PH URINE: 6
PROTEIN URINE: NEGATIVE
RED BLOOD CELLS URINE: 1 /HPF
SPECIFIC GRAVITY URINE: 1.01
SQUAMOUS EPITHELIAL CELLS: 1 /HPF
UROBILINOGEN URINE: NORMAL
WHITE BLOOD CELLS URINE: 0 /HPF

## 2022-10-19 DIAGNOSIS — Z23 ENCOUNTER FOR IMMUNIZATION: ICD-10-CM

## 2022-10-19 DIAGNOSIS — M19.90 UNSPECIFIED OSTEOARTHRITIS, UNSPECIFIED SITE: ICD-10-CM

## 2022-10-21 ENCOUNTER — OUTPATIENT (OUTPATIENT)
Dept: OUTPATIENT SERVICES | Facility: HOSPITAL | Age: 62
LOS: 1 days | End: 2022-10-21
Payer: SELF-PAY

## 2022-10-21 ENCOUNTER — APPOINTMENT (OUTPATIENT)
Dept: RADIOLOGY | Facility: CLINIC | Age: 62
End: 2022-10-21

## 2022-10-21 DIAGNOSIS — M19.90 UNSPECIFIED OSTEOARTHRITIS, UNSPECIFIED SITE: ICD-10-CM

## 2022-10-21 DIAGNOSIS — Z23 ENCOUNTER FOR IMMUNIZATION: ICD-10-CM

## 2022-10-21 PROCEDURE — 73130 X-RAY EXAM OF HAND: CPT

## 2022-10-21 PROCEDURE — 73130 X-RAY EXAM OF HAND: CPT | Mod: 26,LT

## 2022-10-24 LAB
14-3-3 ETA AG SER IA-MCNC: <0.2 NG/ML
CCP ANTIBODIES IGG/IGA: <20 UNITS
RHEUMATOID FACTOR IDENTRA: <14 UNITS/ML

## 2022-10-28 ENCOUNTER — APPOINTMENT (OUTPATIENT)
Dept: RHEUMATOLOGY | Facility: HOSPITAL | Age: 62
End: 2022-10-28

## 2022-10-28 ENCOUNTER — OUTPATIENT (OUTPATIENT)
Dept: OUTPATIENT SERVICES | Facility: HOSPITAL | Age: 62
LOS: 1 days | End: 2022-10-28
Payer: SELF-PAY

## 2022-10-28 VITALS
RESPIRATION RATE: 14 BRPM | HEIGHT: 59 IN | TEMPERATURE: 97 F | DIASTOLIC BLOOD PRESSURE: 71 MMHG | HEART RATE: 66 BPM | WEIGHT: 119 LBS | SYSTOLIC BLOOD PRESSURE: 147 MMHG | BODY MASS INDEX: 23.99 KG/M2

## 2022-10-28 DIAGNOSIS — M06.9 RHEUMATOID ARTHRITIS, UNSPECIFIED: ICD-10-CM

## 2022-10-28 PROCEDURE — ZZZZZ: CPT

## 2022-10-28 PROCEDURE — G0463: CPT

## 2022-10-28 RX ORDER — FLUTICASONE PROPIONATE AND SALMETEROL 50; 250 UG/1; UG/1
250-50 POWDER RESPIRATORY (INHALATION) TWICE DAILY
Qty: 4 | Refills: 0 | Status: DISCONTINUED | COMMUNITY
Start: 2022-05-20 | End: 2022-10-28

## 2022-10-28 RX ORDER — GUAIFENESIN AND PSEUDOEPHEDRINE HCL 1200; 120 MG/1; MG/1
120-1200 TABLET, EXTENDED RELEASE ORAL
Qty: 60 | Refills: 0 | Status: DISCONTINUED | COMMUNITY
Start: 2022-05-13 | End: 2022-10-28

## 2022-10-28 RX ORDER — BENZONATATE 150 MG/1
150 CAPSULE ORAL
Qty: 21 | Refills: 0 | Status: DISCONTINUED | COMMUNITY
Start: 2022-05-11 | End: 2022-10-28

## 2022-10-28 RX ORDER — AZELASTINE HYDROCHLORIDE 0.5 MG/ML
0.05 SOLUTION/ DROPS OPHTHALMIC TWICE DAILY
Qty: 1 | Refills: 1 | Status: DISCONTINUED | COMMUNITY
Start: 2022-05-18 | End: 2022-10-28

## 2022-10-28 RX ORDER — INHALER, ASSIST DEVICES
SPACER (EA) MISCELLANEOUS
Qty: 1 | Refills: 0 | Status: DISCONTINUED | COMMUNITY
Start: 2022-05-11 | End: 2022-10-28

## 2022-10-31 ENCOUNTER — APPOINTMENT (OUTPATIENT)
Dept: PODIATRY | Facility: HOSPITAL | Age: 62
End: 2022-10-31

## 2022-10-31 ENCOUNTER — OUTPATIENT (OUTPATIENT)
Dept: OUTPATIENT SERVICES | Facility: HOSPITAL | Age: 62
LOS: 1 days | End: 2022-10-31
Payer: SELF-PAY

## 2022-10-31 ENCOUNTER — RESULT REVIEW (OUTPATIENT)
Age: 62
End: 2022-10-31

## 2022-10-31 VITALS
BODY MASS INDEX: 23.99 KG/M2 | HEIGHT: 59 IN | WEIGHT: 119 LBS | RESPIRATION RATE: 16 BRPM | TEMPERATURE: 96.5 F | HEART RATE: 62 BPM | DIASTOLIC BLOOD PRESSURE: 64 MMHG | SYSTOLIC BLOOD PRESSURE: 134 MMHG

## 2022-10-31 DIAGNOSIS — Z87.39 PERSONAL HISTORY OF OTHER DISEASES OF THE MUSCULOSKELETAL SYSTEM AND CONNECTIVE TISSUE: ICD-10-CM

## 2022-10-31 DIAGNOSIS — M19.90 UNSPECIFIED OSTEOARTHRITIS, UNSPECIFIED SITE: ICD-10-CM

## 2022-10-31 DIAGNOSIS — M79.609 PAIN IN UNSPECIFIED LIMB: ICD-10-CM

## 2022-10-31 PROCEDURE — G0463: CPT

## 2022-10-31 PROCEDURE — 73630 X-RAY EXAM OF FOOT: CPT | Mod: 26,50

## 2022-10-31 PROCEDURE — 73630 X-RAY EXAM OF FOOT: CPT

## 2022-10-31 RX ORDER — OMEPRAZOLE 20 MG/1
20 TABLET, DELAYED RELEASE ORAL DAILY
Qty: 30 | Refills: 0 | Status: DISCONTINUED | COMMUNITY
Start: 2022-05-18 | End: 2022-10-31

## 2022-10-31 RX ORDER — AZELASTINE HYDROCHLORIDE 137 UG/1
0.1 SPRAY, METERED NASAL TWICE DAILY
Qty: 1 | Refills: 0 | Status: DISCONTINUED | COMMUNITY
Start: 2022-05-13 | End: 2022-10-31

## 2022-10-31 RX ORDER — LORATADINE 10 MG/1
10 TABLET ORAL
Qty: 30 | Refills: 0 | Status: DISCONTINUED | COMMUNITY
Start: 2022-05-11 | End: 2022-10-31

## 2022-10-31 NOTE — PHYSICAL EXAM
[2+] : left foot dorsalis pedis 2+ [Skin Turgor] : normal skin turgor [] : no rash [Skin Lesions] : no skin lesions [de-identified] : 5/5 muscle strength b/l . +gastroc equinus/silverskoid test. Painful medial protuberance of the right foot bunion with overlaying second digit hammer toe. Pain on palpation to medial

## 2022-10-31 NOTE — ASSESSMENT
[FreeTextEntry1] : 61 y with RF painful bunion\par -Patient  seen and evaluated with  [id number]\par -Painful medial protuberance of the right foot bunion with overlaying second digit hammer toe. Pain on palpation to medial \par -Patient has gastritis and his unable to tolerate NSDAIDS, rec aleve or naproxen \par -Patient advised to wear better supporting shoes for her plantar fascitis\par -Patient advised to wear wider toe box shoe to alleviate the pain of her bunion\par -Patient educated in both conservative and surgical treatment options for her bunions\par -Patient educated in different stretching exercise for her tight achilles\par -B/L feet xrays ordered\par -Will evaluate bunion once xrays results received\par -Will do an injection of the left heel at next visit if pain persist and after evaluation of xray\par - RTC 2 weeks

## 2022-10-31 NOTE — HISTORY OF PRESENT ILLNESS
[FreeTextEntry1] : Patient present to clinic for painful bunion of the right foot. Patient states that hse had sx 25 years ago for her right bunion, however she recently started to feel pain to her joint due to her arthritis. Patient also states that she has pain  to her left heel. States that her pain on her left heel is mostly at the end of the day. She denies any other pedal issues at this time. Denies fever, nausea, chills, vomiting.

## 2022-11-04 DIAGNOSIS — M19.90 UNSPECIFIED OSTEOARTHRITIS, UNSPECIFIED SITE: ICD-10-CM

## 2022-11-04 NOTE — REASON FOR VISIT
[Initial Evaluation] : an initial evaluation [FreeTextEntry1] : Joint pain [Interpreters_IDNumber] : [id number] [TWNoteComboBox1] : Togolese

## 2022-11-04 NOTE — HISTORY OF PRESENT ILLNESS
[Dry Mouth] : dry mouth [Joint Swelling] : joint swelling [Arthralgias] : arthralgias [FreeTextEntry1] : 62 yo F referred for chronic joint pain of the hands and concern for inflammatory arthritis\par \par # Hand pain for approximately 5 years and progressively worse over the last few months.\par Involvement are MCP b/l 1-3. L>R. Morning stiffness lasts less than 20 minutes. Resolves after taking hot shower. Pain is worse at the end of the day after doing repetitive tasks (she is a ). Reports joint swelling.  Symptom control with Voltaren gel and Tylenol sparingly. Reports only mild improvement with these medications. \par Last May, she was on a Prednisone taper starting at 40 mg daily for asthma exacerbation and did not notice any change to her arthritis. \par \par SLE ROS negative except for dry mouth. Spondyloarthropathy ROS negative. \par \par # R shoulder pain: Started 10 years ago after trauma. Did not ever do physical therapy\par \par # R toe pain: Chronic issue. s/p R halllux valgus sx, but still with incorrect alignment causing hammer toe of 2nd digit and rubbing of skin against shoe\par \par Relevant Medical Hx: Reported Gastritis\par Pregnancy Hx: 3 full term pregnancies without complications and 1 miscarriage at 3 weeks\par \par Prior workup: JOHNNY negative, RF negative, ESR and CRP negative\par \par L Hand Xray Oct 2022: 3rd DIP OA changes.  [Anorexia] : no anorexia [Weight Loss] : no weight loss [Malaise] : no malaise [Fever] : no fever [Chills] : no chills [Fatigue] : no fatigue [Depression] : no depression [Malar Facial Rash] : no malar facial rash [Skin Lesions] : no lesions [Skin Nodules] : no skin nodules [Oral Ulcers] : no oral ulcers [Cough] : no cough [Dysphonia] : no dysphonia [Dysphagia] : no dysphagia [Shortness of Breath] : no shortness of breath [Chest Pain] : no chest pain [Joint Warmth] : no joint warmth [Decreased ROM] : no decreased range of motion [Morning Stiffness] : no morning stiffness [Falls] : no falls [Difficulty Standing] : no difficulty standing [Difficulty Walking] : no difficulty walking [Dyspnea] : no dyspnea [Myalgias] : no myalgias [Muscle Weakness] : no muscle weakness [Muscle Spasms] : no muscle spasms [Muscle Cramping] : no muscle cramping [Visual Changes] : no visual changes [Eye Pain] : no eye pain [Eye Redness] : no eye redness [Dry Eyes] : no dry eyes

## 2022-11-04 NOTE — PROCEDURE
[Today's Date:] : Date: [unfilled] [Risks] : risks [Benefits] : benefits [Consent Obtained] : written consent was obtained prior to the procedure and is detailed in the patient's record [Patient] : Prior to the start of the procedure a time out was taken and the identity of the patient was confirmed via name and date of birth with the patient. The correct site and the procedure to be performed were confirmed. The correct side was confirmed if applicable. The availability of the correct equipment was verified [Therapeutic] : therapeutic [#1 Site: ______] : #1 site identified in the [unfilled] [Ethyl Chloride] : ethyl chloride [___ ml Inj] : [unfilled] ~Uml [1%] : 1%  [Chlorhexidine] : chlorhexidine [22 gauge 1 inch] : A 22 gauge 1 inch needle was used [Depomedrol ___ mg] : Depomedrol [unfilled] mg [Tolerated Well] : the patient tolerated the procedure well [No Complications] : there were no complications [Instructions Given] : handouts/patient instructions were given to patient [de-identified] : Bicipitis Tendinitis

## 2022-11-04 NOTE — END OF VISIT
[] : Fellow [FreeTextEntry3] : Patient seen with Dr Bower. Pain in hands most likely OA. Bicipital tendonitis, hammer toe. Refer to Podiatry

## 2022-11-04 NOTE — PHYSICAL EXAM
[General Appearance - Alert] : alert [General Appearance - In No Acute Distress] : in no acute distress [Sclera] : the sclera and conjunctiva were normal [Outer Ear] : the ears and nose were normal in appearance [Neck Appearance] : the appearance of the neck was normal [Auscultation Breath Sounds / Voice Sounds] : lungs were clear to auscultation bilaterally [Heart Rate And Rhythm] : heart rate was normal and rhythm regular [Heart Sounds] : normal S1 and S2 [Edema] : there was no peripheral edema [Abdomen Soft] : soft [Abdomen Tenderness] : non-tender [Cervical Lymph Nodes Enlarged Posterior Bilaterally] : posterior cervical [Cervical Lymph Nodes Enlarged Anterior Bilaterally] : anterior cervical [Supraclavicular Lymph Nodes Enlarged Bilaterally] : supraclavicular [No Spinal Tenderness] : no spinal tenderness [] : no rash [No Focal Deficits] : no focal deficits [FreeTextEntry1] : Heberden nodes present; MCP hypertrophy of 1-3 joints without swelling and with tenderness to palpation;  squaring of CMC joints b/l; R shoulder with adquate ROM but with tenderness in bicipital groove area; Hammer toe with skin ulceration on R 2nd digit with hallux valgus deformity

## 2022-11-04 NOTE — ASSESSMENT
[FreeTextEntry1] : 60 yo F referred for chronic joint pain of the hands and concern for inflammatory arthritis. Clinical hx and exam suggestive of Osteoarthritis (worse with repetitive use, lack of morning stiffness greater than an hour, no evidence of inflammation on exam, no improvement while on steroids in May 2022, inflammatory markers negative)\par \par #Chronic b/l hand pain L>R\par - c/w Voltaren gel daily\par - switch to Tylenol Arthritis and use daily \par \par # R shoulder pain (chronic - trauma induced) with findings suggestive of bicipital tendinitis on exam\par - s/p R shoulder injection for bicipital tendinitis during office visit today (see procedure note)\par - c/w Voltaren gel\par - start PT for R shoulder\par \par #R foot pain (hammer toe of 2nd digit causing pain and skin ulceration due to hallux valgus deformity)\par - referral to Podiatry provided \par \par f/u in 2 months\par \par d/w Dr. Bravo

## 2022-11-14 ENCOUNTER — OUTPATIENT (OUTPATIENT)
Dept: OUTPATIENT SERVICES | Facility: HOSPITAL | Age: 62
LOS: 1 days | End: 2022-11-14
Payer: SELF-PAY

## 2022-11-14 ENCOUNTER — APPOINTMENT (OUTPATIENT)
Dept: PODIATRY | Facility: HOSPITAL | Age: 62
End: 2022-11-14

## 2022-11-14 VITALS
WEIGHT: 119 LBS | TEMPERATURE: 97 F | SYSTOLIC BLOOD PRESSURE: 146 MMHG | HEART RATE: 16 BPM | DIASTOLIC BLOOD PRESSURE: 77 MMHG | HEIGHT: 59 IN | BODY MASS INDEX: 23.99 KG/M2

## 2022-11-14 DIAGNOSIS — M79.609 PAIN IN UNSPECIFIED LIMB: ICD-10-CM

## 2022-11-14 DIAGNOSIS — M72.2 PLANTAR FASCIAL FIBROMATOSIS: ICD-10-CM

## 2022-11-14 DIAGNOSIS — M20.11 HALLUX VALGUS (ACQUIRED), RIGHT FOOT: ICD-10-CM

## 2022-11-14 PROCEDURE — G0463: CPT

## 2022-11-14 NOTE — HISTORY OF PRESENT ILLNESS
[FreeTextEntry1] : Patient returns to clinic for painful bunion of the right foot. Patient states that hse had sx 25 years ago for her right bunion, however she recently started to feel pain to her joint due to her arthritis. Patient also states that she has pain to her left heel, which has improved and only bothers her at the end of the day. She denies any other pedal issues at this time. Denies fever, nausea, chills, vomiting.

## 2022-11-14 NOTE — PHYSICAL EXAM
[2+] : left foot dorsalis pedis 2+ [Skin Turgor] : normal skin turgor [] : no rash [Skin Lesions] : no skin lesions [de-identified] : 5/5 muscle strength b/l . +gastroc equinus/silverskoid test. Painful medial protuberance of the right foot bunion with overlaying second digit hammer toe. Pain on palpation to medial mpj, hypermobile 1st ray right foot

## 2022-11-14 NOTE — ASSESSMENT
[FreeTextEntry1] : 61 y with RF painful bunion\par -Patient  seen and evaluated with  [id number]\par -Painful medial protuberance of the right foot bunion with overlaying second digit hammer toe. Pain on palpation to medial mpj, hypermobile 1st ray right foot, mild tenderness on palpation of L heel medial calcaneal tubercle\par -Patient has gastritis and his unable to tolerate NSDAIDS, rec aleve or naproxen \par -Patient advised to wear better supporting shoes for her plantar fascitis\par -Patient advised to wear wider toe box shoe to alleviate the pain of her bunion\par -Patient educated in both conservative and surgical treatment options for her bunions. Patient would like to continue with conservative treatment at this time, not interested in surgical intervention right foot\par - Instructed patient on strapping for overlapping 2nd digit right foot \par -B/L feet xrays reviewed and discussed findings with patient \par - RTC PRN

## 2022-11-15 NOTE — DISCUSSION/SUMMARY
[FreeTextEntry1] : REASON FOR CONSULT\par Desire Solares is a 61-year-old female who was called 10/07/2022 for a discussion regarding her negative genetic testing results related to hereditary cancer predisposition. This consultation was carried out with the aid of a  #[id number]. \par \par Ms. Solares was originally seen at [facility] on 08/24/2022 for hereditary cancer predisposition risk assessment. She has no personal history of cancer but she has a family history of ovarian cancer. Ms. Solares decided to pursue genetic testing using a guidelines-based breast and gyn cancers panel (19 genes) offered by [facility]. \par \par TEST RESULTS: NEGATIVE\par \par NO pathogenic (disease-causing) variants or VUSs were detected in the following genes: JIM*, BARD1, BRCA1, BRCA2, BRIP1, CDH1, CHEK2, EPCAM*, MLH1*, MSH2*, MSH6*, NF1*, PALB2, PMS2*, PTEN*, RAD51C, RAD51D, STK11, TP53\par \par RESULTS INTERPRETATION AND ASSESSMENT:\par Given Ms. Solares’s personal and current reported family history of cancer, her negative genetic test results, and in the absence of other indications, she should practice age-appropriate cancer screening of other organ systems as recommended for the general population.\par \par We also discussed the limitations of negative results:\par 1.	The cause of Ms. Solares’s family history of cancer remains unknown. The cancer(s) may have developed randomly, or due to environmental factors.  \par 2.	This negative result does not completely rule out a hereditary basis for the reported personal and/or family history due to limitations in technology or a variant being present in an unidentified gene. \par 3.	Variants in other genes would not be identified by this analysis, so this negative result does not rule out the low likelihood of having a mutation in a different hereditary cancer gene or the possibility of ever developing cancer.\par 4.	It is possible there is a hereditary cancer predisposition gene mutation in the family, but the patient did not inherit it. \par \par We informed Ms. Solares that our knowledge of genetics and inherited cancer conditions is changing rapidly. Therefore, we recommended that Ms. Solares contact our office, every 2 to 3 years, to discuss relevant advances in cancer genetics.  We emphasized the importance of re-contacting us with updates regarding her personal and family history of cancer as well as any updates regarding additional cancer genetic test results performed for the patient and/or family members.  Such updates could possibly change our risk assessment and recommendations. \par \par In addition, we discussed Ms. Solares’s siblings may consider pursuing cancer risk assessment genetic counseling with the option of genetic testing given their family history of ovarian cancer. \par \par PLAN:\par 1.	These results do not change Ms. Solares’s medical management. \par 2.	Patient informed consult note(s) will be available through their [facility] patient portal and genetic test results will be released via [facility]’s laboratory portal. \par 3.	Ms. Solares was encouraged to contact us every 2-3 years to discuss relevant advances in cancer genetics, or sooner if there are any changes in her personal or family history of cancer.\par \par \par For any additional questions please call [facility] at [phone]. \par \par \par Kj Newton MS, [facility]\par Genetic Counselor, [facility]\par \par \par CC: \par Patient\par Dr. Jon Duron

## 2023-01-13 ENCOUNTER — OUTPATIENT (OUTPATIENT)
Dept: OUTPATIENT SERVICES | Facility: HOSPITAL | Age: 63
LOS: 1 days | End: 2023-01-13
Payer: SELF-PAY

## 2023-01-13 ENCOUNTER — APPOINTMENT (OUTPATIENT)
Dept: RHEUMATOLOGY | Facility: HOSPITAL | Age: 63
End: 2023-01-13
Payer: COMMERCIAL

## 2023-01-13 VITALS
HEART RATE: 78 BPM | DIASTOLIC BLOOD PRESSURE: 77 MMHG | WEIGHT: 120 LBS | RESPIRATION RATE: 14 BRPM | HEIGHT: 59 IN | SYSTOLIC BLOOD PRESSURE: 126 MMHG | BODY MASS INDEX: 24.19 KG/M2 | TEMPERATURE: 97.4 F

## 2023-01-13 DIAGNOSIS — M06.9 RHEUMATOID ARTHRITIS, UNSPECIFIED: ICD-10-CM

## 2023-01-13 PROCEDURE — G0463: CPT

## 2023-01-13 PROCEDURE — ZZZZZ: CPT | Mod: GC

## 2023-01-13 RX ORDER — ALBUTEROL SULFATE 90 UG/1
108 AEROSOL, METERED RESPIRATORY (INHALATION)
Refills: 0 | Status: DISCONTINUED | COMMUNITY
End: 2023-01-13

## 2023-01-13 RX ORDER — ACETAMINOPHEN ER 650 MG TABLET,EXTENDED RELEASE 650 MG
650 TABLET, EXTENDED RELEASE ORAL 4 TIMES DAILY
Refills: 0 | Status: ACTIVE | COMMUNITY
Start: 2023-01-13

## 2023-01-13 RX ORDER — CETIRIZINE HYDROCHLORIDE 10 MG/1
10 TABLET, COATED ORAL
Qty: 30 | Refills: 1 | Status: DISCONTINUED | COMMUNITY
Start: 2022-05-18 | End: 2023-01-13

## 2023-01-13 RX ORDER — PREDNISONE 10 MG/1
10 TABLET ORAL
Qty: 50 | Refills: 0 | Status: DISCONTINUED | COMMUNITY
Start: 2022-05-18 | End: 2023-01-13

## 2023-01-13 RX ORDER — PERPHENAZINE 4 MG
TABLET ORAL
Refills: 0 | Status: DISCONTINUED | COMMUNITY
End: 2023-01-13

## 2023-01-13 RX ORDER — OLOPATADINE HCL 1 MG/ML
0.1 SOLUTION/ DROPS OPHTHALMIC TWICE DAILY
Qty: 1 | Refills: 0 | Status: DISCONTINUED | COMMUNITY
Start: 2022-05-13 | End: 2023-01-13

## 2023-01-13 RX ORDER — FLUTICASONE PROPIONATE AND SALMETEROL 113; 14 UG/1; UG/1
113-14 POWDER, METERED RESPIRATORY (INHALATION) TWICE DAILY
Qty: 1 | Refills: 2 | Status: DISCONTINUED | COMMUNITY
Start: 2022-05-18 | End: 2023-01-13

## 2023-01-20 DIAGNOSIS — M15.8 OTHER POLYOSTEOARTHRITIS: ICD-10-CM

## 2023-01-20 NOTE — REASON FOR VISIT
[Follow-Up: _____] : a [unfilled] follow-up visit [FreeTextEntry1] : OA [Interpreters_IDNumber] : [id number] [TWNoteComboBox1] : Angolan

## 2023-01-20 NOTE — END OF VISIT
[] : Fellow [FreeTextEntry3] : Patient seen with Dr Dutta. Hand OA and shoulder over use (occupational).

## 2023-01-20 NOTE — HISTORY OF PRESENT ILLNESS
[FreeTextEntry1] : Paint reports that her R shoulder felt better after injection. Some of the pain has returned. She uses Tylenol. She continues to have pain in the first and 2nd fingers of both hands. Pain is always worse at the end of the day after use. Voltaren gel provides relief. She saw Podiatry and elected for conservative management of her R bunion.

## 2023-01-20 NOTE — ASSESSMENT
[FreeTextEntry1] : 60 yo F referred for chronic joint pain of the hands and concern for inflammatory arthritis. Clinical hx and exam suggestive of Osteoarthritis (worse with repetitive use, lack of morning stiffness greater than an hour, no evidence of inflammation on exam, no improvement while on steroids in May 2022, inflammatory markers negative)\par \par #Chronic b/l hand pain L>R\par - c/w Voltaren gel daily\par - c/w Tylenol Arthritis daily\par - OT referral for hands \par \par # R shoulder pain (chronic - trauma induced) likely OA component as well\par - s/p R shoulder injection for bicipital tendinitis October 2022\par - c/w Voltaren gel\par - c/w PT for R shoulder\par \par #R foot pain (hammer toe of 2nd digit causing pain and skin ulceration due to hallux valgus deformity)\par - Podiatry recs appreciated; f/u as needed\par \par #Bone Health\par - Obtain baseline BMD\par \par f/u in 4 months\par \par d/w Dr. Bravo.

## 2023-01-20 NOTE — PHYSICAL EXAM
[General Appearance - Alert] : alert [General Appearance - In No Acute Distress] : in no acute distress [Sclera] : the sclera and conjunctiva were normal [Outer Ear] : the ears and nose were normal in appearance [Neck Appearance] : the appearance of the neck was normal [Auscultation Breath Sounds / Voice Sounds] : lungs were clear to auscultation bilaterally [Heart Rate And Rhythm] : heart rate was normal and rhythm regular [Heart Sounds] : normal S1 and S2 [Edema] : there was no peripheral edema [Abdomen Soft] : soft [Abdomen Tenderness] : non-tender [Cervical Lymph Nodes Enlarged Posterior Bilaterally] : posterior cervical [Cervical Lymph Nodes Enlarged Anterior Bilaterally] : anterior cervical [Supraclavicular Lymph Nodes Enlarged Bilaterally] : supraclavicular [No Spinal Tenderness] : no spinal tenderness [] : no rash [No Focal Deficits] : no focal deficits [FreeTextEntry1] : Heberden nodes present; MCP hypertrophy of 1-3 joints without swelling and with tenderness to palpation;  squaring of CMC joints b/l; R shoulder with adquate ROM but with anterior tenderness; Hammer toe with skin ulceration on R 2nd digit with hallux valgus deformity

## 2023-02-07 ENCOUNTER — OUTPATIENT (OUTPATIENT)
Dept: OUTPATIENT SERVICES | Facility: HOSPITAL | Age: 63
LOS: 1 days | End: 2023-02-07
Payer: SELF-PAY

## 2023-02-07 ENCOUNTER — APPOINTMENT (OUTPATIENT)
Dept: GASTROENTEROLOGY | Facility: HOSPITAL | Age: 63
End: 2023-02-07
Payer: COMMERCIAL

## 2023-02-07 VITALS
BODY MASS INDEX: 24.19 KG/M2 | RESPIRATION RATE: 16 BRPM | TEMPERATURE: 98.1 F | HEART RATE: 73 BPM | DIASTOLIC BLOOD PRESSURE: 72 MMHG | SYSTOLIC BLOOD PRESSURE: 117 MMHG | HEIGHT: 59 IN | WEIGHT: 120 LBS

## 2023-02-07 DIAGNOSIS — R10.13 EPIGASTRIC PAIN: ICD-10-CM

## 2023-02-07 DIAGNOSIS — R10.9 UNSPECIFIED ABDOMINAL PAIN: ICD-10-CM

## 2023-02-07 PROCEDURE — G0463: CPT

## 2023-02-07 PROCEDURE — ZZZZZ: CPT | Mod: GC

## 2023-02-07 RX ORDER — PANTOPRAZOLE 40 MG/1
40 TABLET, DELAYED RELEASE ORAL DAILY
Qty: 30 | Refills: 2 | Status: ACTIVE | COMMUNITY
Start: 2023-02-07 | End: 1900-01-01

## 2023-02-07 RX ORDER — POLYETHYLENE GLYCOL 3350 17 G/17G
17 POWDER, FOR SOLUTION ORAL
Qty: 14 | Refills: 0 | Status: ACTIVE | COMMUNITY
Start: 2023-02-07 | End: 1900-01-01

## 2023-02-07 NOTE — END OF VISIT
[] : Fellow [FreeTextEntry3] : As modified and discussed with patient\par MD GWENDOLYN Pham [facility]\par Associate Professor of Medicine\par [facility]\par

## 2023-02-07 NOTE — ASSESSMENT
[FreeTextEntry1] : 61yo F with PMH of chronic cough, arthralgia who presents for CRC screening and bloating.\par \par # Dyspepsia - longstanding symptoms of dyspepsia. No red flags. Given patient is over 60 and planned for colonoscopy reasonable to pursue EGD at the same time with H.pylori biopsies and rule out intestinal metaplasia of antrum.\par # CRC screening - Average risk. Discussed different screening modalities, patient elected for colonoscopy. Risks and benefits discussed. All questions answered.\par \par Plan:\par - Colonoscopy + EGD\par - trial of PPI\par - Low FODMAP diet\par - RTC in 3 months\par \par Discussed with Dr. Maguire.\par

## 2023-02-07 NOTE — HISTORY OF PRESENT ILLNESS
[FreeTextEntry1] : Ms. Snow is a 63yo F with PMH of chronic cough, arthralgia who presents for CRC screening and abdominal  bloating.\par \par Patient reports longstanding (over 20 years) symptoms of bloating, usually 30 min following a meal that lasts several hours. Denies significant heartburn or reflux. Endorses "gastritis" pain. No night time episodes. No weight loss, blood in stools. No dysphagia, odynophagia, nausea, vomiting. No early satiety. Reports mother had ovarian cancer. Reports distant (20 years ago) colonoscopy. Originally from [facility]. Never tried PPIs. Not on NSAIDs.  \par \par Her mother had ovarian cancer.  There is no other family history of colon cancer, colon polyp, peptic ulcer disease, female genitourinary disease, liver disease, cholelithiasis, pancreatic disease or cancer, inflammatory bowel disease or celiac disease.

## 2023-02-07 NOTE — PHYSICAL EXAM
[Alert] : alert [Well Developed] : well developed [Sclera] : the sclera and conjunctiva were normal [Heart Rate And Rhythm] : heart rate was normal and rhythm regular [Bowel Sounds] : normal bowel sounds [Abdomen Tenderness] : non-tender [No Masses] : no abdominal mass palpated [Abdomen Soft] : soft [] : no rash [No Focal Deficits] : no focal deficits [Oriented To Time, Place, And Person] : oriented to person, place, and time

## 2023-02-08 DIAGNOSIS — M25.552 PAIN IN LEFT HIP: ICD-10-CM

## 2023-02-08 DIAGNOSIS — R10.13 EPIGASTRIC PAIN: ICD-10-CM

## 2023-02-08 DIAGNOSIS — R14.0 ABDOMINAL DISTENSION (GASEOUS): ICD-10-CM

## 2023-02-08 DIAGNOSIS — Z12.11 ENCOUNTER FOR SCREENING FOR MALIGNANT NEOPLASM OF COLON: ICD-10-CM

## 2023-02-10 ENCOUNTER — OUTPATIENT (OUTPATIENT)
Dept: OUTPATIENT SERVICES | Facility: HOSPITAL | Age: 63
LOS: 1 days | End: 2023-02-10
Payer: COMMERCIAL

## 2023-02-10 ENCOUNTER — APPOINTMENT (OUTPATIENT)
Dept: RADIOLOGY | Facility: CLINIC | Age: 63
End: 2023-02-10
Payer: COMMERCIAL

## 2023-02-10 DIAGNOSIS — Z00.8 ENCOUNTER FOR OTHER GENERAL EXAMINATION: ICD-10-CM

## 2023-02-10 DIAGNOSIS — M25.552 PAIN IN LEFT HIP: ICD-10-CM

## 2023-02-10 PROCEDURE — 73521 X-RAY EXAM HIPS BI 2 VIEWS: CPT

## 2023-02-10 PROCEDURE — 73521 X-RAY EXAM HIPS BI 2 VIEWS: CPT | Mod: 26

## 2023-02-20 ENCOUNTER — OUTPATIENT (OUTPATIENT)
Dept: OUTPATIENT SERVICES | Facility: HOSPITAL | Age: 63
LOS: 1 days | End: 2023-02-20
Payer: SELF-PAY

## 2023-02-20 DIAGNOSIS — Z11.52 ENCOUNTER FOR SCREENING FOR COVID-19: ICD-10-CM

## 2023-02-20 LAB — SARS-COV-2 RNA SPEC QL NAA+PROBE: SIGNIFICANT CHANGE UP

## 2023-02-20 PROCEDURE — C9803: CPT

## 2023-02-20 PROCEDURE — U0005: CPT

## 2023-02-20 PROCEDURE — U0003: CPT

## 2023-02-23 ENCOUNTER — OUTPATIENT (OUTPATIENT)
Dept: OUTPATIENT SERVICES | Facility: HOSPITAL | Age: 63
LOS: 1 days | End: 2023-02-23
Payer: SELF-PAY

## 2023-02-23 ENCOUNTER — RESULT REVIEW (OUTPATIENT)
Age: 63
End: 2023-02-23

## 2023-02-23 ENCOUNTER — TRANSCRIPTION ENCOUNTER (OUTPATIENT)
Age: 63
End: 2023-02-23

## 2023-02-23 VITALS
HEART RATE: 60 BPM | DIASTOLIC BLOOD PRESSURE: 64 MMHG | OXYGEN SATURATION: 100 % | SYSTOLIC BLOOD PRESSURE: 155 MMHG | RESPIRATION RATE: 16 BRPM

## 2023-02-23 VITALS
HEIGHT: 58.27 IN | OXYGEN SATURATION: 98 % | TEMPERATURE: 98 F | DIASTOLIC BLOOD PRESSURE: 68 MMHG | HEART RATE: 52 BPM | WEIGHT: 119.93 LBS | RESPIRATION RATE: 20 BRPM | SYSTOLIC BLOOD PRESSURE: 147 MMHG

## 2023-02-23 DIAGNOSIS — R10.13 EPIGASTRIC PAIN: ICD-10-CM

## 2023-02-23 DIAGNOSIS — Z98.890 OTHER SPECIFIED POSTPROCEDURAL STATES: Chronic | ICD-10-CM

## 2023-02-23 DIAGNOSIS — Z12.89 ENCOUNTER FOR SCREENING FOR MALIGNANT NEOPLASM OF OTHER SITES: ICD-10-CM

## 2023-02-23 PROCEDURE — 45378 DIAGNOSTIC COLONOSCOPY: CPT | Mod: GC

## 2023-02-23 PROCEDURE — 43239 EGD BIOPSY SINGLE/MULTIPLE: CPT

## 2023-02-23 PROCEDURE — 88305 TISSUE EXAM BY PATHOLOGIST: CPT

## 2023-02-23 PROCEDURE — 88305 TISSUE EXAM BY PATHOLOGIST: CPT | Mod: 26

## 2023-02-23 PROCEDURE — 43239 EGD BIOPSY SINGLE/MULTIPLE: CPT | Mod: GC

## 2023-02-23 RX ORDER — LIDOCAINE HCL 20 MG/ML
4 VIAL (ML) INJECTION ONCE
Refills: 0 | Status: DISCONTINUED | OUTPATIENT
Start: 2023-02-23 | End: 2023-03-09

## 2023-02-23 NOTE — PRE PROCEDURE NOTE - PRE PROCEDURE EVALUATION
Attending Physician:  [facility]                          Procedure: EGD/Colonoscopy    Indication for Procedure: CRC screening and dyspepsia   ________________________________________________________  PAST MEDICAL & SURGICAL HISTORY:    ALLERGIES:    HOME MEDICATIONS:    AICD/PPM: [ ] yes   [ ] no    PERTINENT LAB DATA:                      PHYSICAL EXAMINATION:    T(C): --  HR: --  BP: --  RR: --  SpO2: --    Constitutional: NAD  HEENT: PERRLA, EOMI,    Neck:  No JVD  Respiratory: CTAB/L  Cardiovascular: S1 and S2  Gastrointestinal: BS+, soft, NT/ND  Extremities: No peripheral edema  Neurological: A/O x 3, no focal deficits  Psychiatric: Normal mood, normal affect  Skin: No rashes    ASA Class: I [ ]  II [ ]  III [ ]  IV [ ]    COMMENTS:    The patient is a suitable candidate for the planned procedure unless box checked [ ]  No, explain:

## 2023-02-27 LAB — SURGICAL PATHOLOGY STUDY: SIGNIFICANT CHANGE UP

## 2023-02-28 RX ORDER — PANTOPRAZOLE 40 MG/1
40 TABLET, DELAYED RELEASE ORAL TWICE DAILY
Qty: 28 | Refills: 0 | Status: ACTIVE | COMMUNITY
Start: 2023-02-28 | End: 1900-01-01

## 2023-02-28 RX ORDER — METRONIDAZOLE 250 MG/1
250 TABLET ORAL EVERY 6 HOURS
Qty: 56 | Refills: 0 | Status: ACTIVE | COMMUNITY
Start: 2023-02-28 | End: 1900-01-01

## 2023-02-28 RX ORDER — BISMUTH SUBSALICYLATE 262 MG/1
262 TABLET, CHEWABLE ORAL EVERY 6 HOURS
Qty: 112 | Refills: 0 | Status: ACTIVE | COMMUNITY
Start: 2023-02-28 | End: 1900-01-01

## 2023-02-28 RX ORDER — TETRACYCLINE HYDROCHLORIDE 500 MG/1
500 CAPSULE ORAL EVERY 6 HOURS
Qty: 56 | Refills: 0 | Status: ACTIVE | COMMUNITY
Start: 2023-02-28 | End: 1900-01-01

## 2023-03-03 ENCOUNTER — OUTPATIENT (OUTPATIENT)
Dept: OUTPATIENT SERVICES | Facility: HOSPITAL | Age: 63
LOS: 1 days | End: 2023-03-03
Payer: SELF-PAY

## 2023-03-03 ENCOUNTER — APPOINTMENT (OUTPATIENT)
Dept: RADIOLOGY | Facility: IMAGING CENTER | Age: 63
End: 2023-03-03
Payer: COMMERCIAL

## 2023-03-03 DIAGNOSIS — Z98.890 OTHER SPECIFIED POSTPROCEDURAL STATES: Chronic | ICD-10-CM

## 2023-03-03 DIAGNOSIS — Z00.8 ENCOUNTER FOR OTHER GENERAL EXAMINATION: ICD-10-CM

## 2023-03-03 PROBLEM — Z78.9 OTHER SPECIFIED HEALTH STATUS: Chronic | Status: ACTIVE | Noted: 2023-02-23

## 2023-03-03 PROCEDURE — 77080 DXA BONE DENSITY AXIAL: CPT | Mod: 26

## 2023-03-03 PROCEDURE — 77080 DXA BONE DENSITY AXIAL: CPT

## 2023-04-25 ENCOUNTER — APPOINTMENT (OUTPATIENT)
Dept: GASTROENTEROLOGY | Facility: HOSPITAL | Age: 63
End: 2023-04-25
Payer: SELF-PAY

## 2023-04-25 ENCOUNTER — OUTPATIENT (OUTPATIENT)
Dept: OUTPATIENT SERVICES | Facility: HOSPITAL | Age: 63
LOS: 1 days | End: 2023-04-25
Payer: SELF-PAY

## 2023-04-25 ENCOUNTER — EMERGENCY (EMERGENCY)
Facility: HOSPITAL | Age: 63
LOS: 1 days | Discharge: HOME | End: 2023-04-25
Attending: EMERGENCY MEDICINE
Payer: MEDICAID

## 2023-04-25 VITALS
RESPIRATION RATE: 14 BRPM | BODY MASS INDEX: 24.19 KG/M2 | DIASTOLIC BLOOD PRESSURE: 76 MMHG | SYSTOLIC BLOOD PRESSURE: 147 MMHG | WEIGHT: 120 LBS | HEIGHT: 59 IN | HEART RATE: 74 BPM | TEMPERATURE: 98 F

## 2023-04-25 VITALS
WEIGHT: 119.93 LBS | HEART RATE: 92 BPM | RESPIRATION RATE: 18 BRPM | OXYGEN SATURATION: 96 % | SYSTOLIC BLOOD PRESSURE: 132 MMHG | DIASTOLIC BLOOD PRESSURE: 85 MMHG | HEIGHT: 63 IN | TEMPERATURE: 99 F

## 2023-04-25 DIAGNOSIS — Z98.890 OTHER SPECIFIED POSTPROCEDURAL STATES: Chronic | ICD-10-CM

## 2023-04-25 DIAGNOSIS — A04.8 OTHER SPECIFIED BACTERIAL INTESTINAL INFECTIONS: ICD-10-CM

## 2023-04-25 DIAGNOSIS — Z12.11 ENCOUNTER FOR SCREENING FOR MALIGNANT NEOPLASM OF COLON: ICD-10-CM

## 2023-04-25 DIAGNOSIS — R14.0 ABDOMINAL DISTENSION (GASEOUS): ICD-10-CM

## 2023-04-25 DIAGNOSIS — R10.9 UNSPECIFIED ABDOMINAL PAIN: ICD-10-CM

## 2023-04-25 DIAGNOSIS — J80 ACUTE RESPIRATORY DISTRESS SYNDROME: ICD-10-CM

## 2023-04-25 DIAGNOSIS — R06.02 SHORTNESS OF BREATH: ICD-10-CM

## 2023-04-25 LAB
FLUAV AG NPH QL: SIGNIFICANT CHANGE UP
FLUBV AG NPH QL: SIGNIFICANT CHANGE UP
RSV RNA NPH QL NAA+NON-PROBE: SIGNIFICANT CHANGE UP
SARS-COV-2 RNA SPEC QL NAA+PROBE: SIGNIFICANT CHANGE UP

## 2023-04-25 PROCEDURE — 71046 X-RAY EXAM CHEST 2 VIEWS: CPT

## 2023-04-25 PROCEDURE — 99285 EMERGENCY DEPT VISIT HI MDM: CPT

## 2023-04-25 PROCEDURE — 93005 ELECTROCARDIOGRAM TRACING: CPT

## 2023-04-25 PROCEDURE — 87637 SARSCOV2&INF A&B&RSV AMP PRB: CPT

## 2023-04-25 PROCEDURE — 99285 EMERGENCY DEPT VISIT HI MDM: CPT | Mod: 25

## 2023-04-25 PROCEDURE — 94640 AIRWAY INHALATION TREATMENT: CPT

## 2023-04-25 PROCEDURE — G0463: CPT

## 2023-04-25 PROCEDURE — ZZZZZ: CPT | Mod: GC

## 2023-04-25 PROCEDURE — 71046 X-RAY EXAM CHEST 2 VIEWS: CPT | Mod: 26

## 2023-04-25 RX ORDER — ACETAMINOPHEN 500 MG
650 TABLET ORAL
Qty: 0 | Refills: 0 | DISCHARGE

## 2023-04-25 RX ORDER — ALBUTEROL 90 UG/1
2 AEROSOL, METERED ORAL ONCE
Refills: 0 | Status: COMPLETED | OUTPATIENT
Start: 2023-04-25 | End: 2023-04-25

## 2023-04-25 RX ORDER — DEXAMETHASONE 0.5 MG/5ML
4 ELIXIR ORAL ONCE
Refills: 0 | Status: COMPLETED | OUTPATIENT
Start: 2023-04-25 | End: 2023-04-25

## 2023-04-25 RX ORDER — IPRATROPIUM/ALBUTEROL SULFATE 18-103MCG
3 AEROSOL WITH ADAPTER (GRAM) INHALATION ONCE
Refills: 0 | Status: COMPLETED | OUTPATIENT
Start: 2023-04-25 | End: 2023-04-25

## 2023-04-25 RX ORDER — KETOTIFEN FUMARATE 0.34 MG/ML
1 SOLUTION OPHTHALMIC ONCE
Refills: 0 | Status: DISCONTINUED | OUTPATIENT
Start: 2023-04-25 | End: 2023-04-29

## 2023-04-25 RX ORDER — DEXAMETHASONE 0.5 MG/5ML
6 ELIXIR ORAL ONCE
Refills: 0 | Status: COMPLETED | OUTPATIENT
Start: 2023-04-25 | End: 2023-04-25

## 2023-04-25 RX ADMIN — ALBUTEROL 2 PUFF(S): 90 AEROSOL, METERED ORAL at 20:30

## 2023-04-25 RX ADMIN — Medication 3 MILLILITER(S): at 19:42

## 2023-04-25 RX ADMIN — Medication 1 DROP(S): at 19:56

## 2023-04-25 RX ADMIN — Medication 3 MILLILITER(S): at 20:08

## 2023-04-25 RX ADMIN — Medication 6 MILLIGRAM(S): at 19:42

## 2023-04-25 RX ADMIN — Medication 3 MILLILITER(S): at 19:59

## 2023-04-25 RX ADMIN — Medication 4 MILLIGRAM(S): at 19:59

## 2023-04-25 NOTE — ED PROVIDER NOTE - CLINICAL SUMMARY MEDICAL DECISION MAKING FREE TEXT BOX
History of Present Illness: 62-year-old female with no significant past medical history here for 8 days of conjunctivitis, nasal congestion, cough, wheezing.  She states that she has had similar symptoms in the springtime in previous years.  She has lived in the [place] for 3 years and works in [employer].  She has previously been to a pulmonologist and was told that she did not have asthma.  She endorses some chest tightness but no chest pain.  She denies any nausea, vomiting, fevers, chills.  She has tried taking over-the-counter second-generation antihistamines with no relief.    Review of Systems: All other systems negative except as noted in the HPI    General: Alert, oriented to person, time, place  Psych: mood appropriate  Head: normocephalic; atraumatic  Eyes: conjunctivae clear bilaterally, sclerae anicteric  ENT: no nasal flaring, patent nares  Cardio: Regular rate and rhythm; normal heart sounds  Resp: Bilateral diffuse end expiratory wheezing  GI: Abdomen soft, nontender, nondistended  : Exam deferred  Neuro: Strength 5/5 in upper and lower extremities; normal sensation  Skin: No rashes or bruising noted  MSK: Normal movement of extremities  Lymph/Vasc: No LE edema    Medical Decision Makin-year-old female with no past medical history here for allergic type symptoms for the past 8 days.  She has end expiratory wheezing on exam.  Suspect reactive airway disease.  Will give DuoNebs, Decadron, artificial tears.  Will obtain chest x-ray and EKG for screening purposes.  Anticipate patient discharge home with improvement of symptoms.

## 2023-04-25 NOTE — ED ADULT TRIAGE NOTE - HAVE YOU RECEIVED AT LEAST TWO PFIZER AND/OR MODERNA VACCINATIONS (IN ANY COMBINATION) AND/OR ONE JOHNSON & JOHNSON VACCINATION?
ONT COLOR=\"#[id number]\">AUDIE PERLA  : 1940  ACCOUNT:  [id number]  [phone]  PCP: Dr. Lauryn Zuleta    TODAY'S DATE: 2017  DICTATED BY:  [Sofi Dangelo M.D.]    CHIEF COMPLAINT: [Followup of Carotid artery stenosis mult vessel < 50% and Followup of History of PTCA.]    HPI:  [On 2017, Audie Perla, a 76-year-old male, presented with no interim cardiac complaints.]    Mr. Perla is a pleasant 76-year-old gentleman with history of abdominal aortic aneurysm repair and endograft. He has normal left ventricular function with diastolic dysfunction. Unfortunately he has severe pulmonary fibrosis and has been on chronic oxygen therapy. He sees Dr. Menon from Pulmonary. He was rejected from lung transplant and stem cell therapy. He does have a history of coronary disease with prior angioplasty and stenting to the obtuse marginal. Stress testing has revealed stable infarct but no evidence for ischemia. He has mild carotid disease, diabetes with neuropathy. He has seen electrophysiology with history of PVCs and an occasional burst of a nonsustained ventricular tachycardia, with supraventricular tachycardia. Dr. Mcghee had recommended continuing beta blockade and he has been asymptomatic. He sees Dr. Neal for his venous insufficiency and this has been stable.      RISK FACTORS:  CAD - Hypertension  CAD Equivalent - Diabetes Carotid Artery Disease AAA    REVIEW OF SYSTEMS:  CONS: no fever, chills, or recent weight changes. EYES: denies significant visual changes. ENMT: no ear pain, discharge, or difficulty hearing. CV: see HPI; denies claudication. RESP: dyspnea and on 10L of O2. GI: denies melena, hematochezia. : no hematuria. INTEG: no new rashes, lesions. MS: no limiting arthritis. NEURO: no slurred speech, seizures, or localized weakness. HEM/LYMPH: denies easy bruising. ALL: no new allergies.    PAST HISTORY: bronchitis,  Polyneuropathy in Diabetes, Ataxia, pulmonary fibrosis,  recurrent cellulitis leg, right arm rotator cuff surgery and back surgery    PAST CV HISTORY: 2/02  stent to OM, AAA repair 2004, cardiac catheterization 2003, ct scan AAA 07, infrarenal aneurysm and MI    FAMILY HISTORY: Negative for premature CAD. Negative for AAA.  SOCIAL HISTORY: SMOKING: Former tobacco use. quit 2/02/03, smoked 2ppd X 48 yrs. CAFFEINE: 3-4 beverages daily and other none caf.. ALCOHOL: denies drinking. EXERCISE: pulmonary rehab 2 times a week. DIET: no special diet. MARITAL STATUS: . OCCUPATION: .    ALLERGIES: Lipitor - Oral, muscle aches, Penicillins - CLASS, Hives, Pravachol - Oral, myalgia, Zocor - Oral and muscle aches    MEDICATIONS: Selected prescriptions see below    VITAL SIGNS: [B/P - 118/60 , Pulse - 85, Weight -  220, Height -   72 , BMI - 29.8 ]    CONS: well developed, well nourished. WEIGHT: BMI parameters reviewed and discussed. HEAD/FACE: no trauma and normocephalic. EYES: conjunctivae not injected and no xanthelasma. ENT: mucosa pink and moist. NECK: jugular venous pressure not elevated. RESP: respirations with normal rate and rhythm, clear to auscultation. GI: no masses, tenderness or hepatosplenomegaly, rectal deferred. MS: inadequate gait for exercise/testing. EXT: no clubbing or cyanosis.  SKIN: venous stasis changes.  NEURO/PSYCH: alert and oriented to time, place and person and normal affect.    CV: PALP: no lifts, thrills or rub and PMI not displaced. AUSC:  regular rhythm, normal S1, S2 without S3 and S4 present; no pathologic murmurs. CAROTIDS: carotid pulses normal. ABD AORTA: difficult to assess abdominal aorta. FEM: normal. PEDAL: pedal pulses intact. EXT: trace edema and venous stasis changes.    DECISION MAKING: Mr. Belcher unfortunately has severe end stage pulmonary fibrosis.  He is on 10 liters of oxygen at all times.  He has a history of an endograft stent repair and this has been doing well.  He has mild carotid disease, diabetes and  history of PVCs with nonsustained VT and supraventricular tachycardia.  He has seen Dr. Mcghee from electrophysiology who has recommended continuing beta blockade and he has been doing well. Stress testing has been without evidence for ischemia.  His main issue really at this point is pulmonary hypertension and severe end stage pulmonary fibrosis.  He is also following up with Dr. Biggs for his pulmonary hypertension and up titrating of medications with advanced heart failure and pulmonary hypertension.  We will check follow up blood work and we will follow this very nice gentleman in office.     ASSESSMENT:  1. . CAD, of native vessels  2. Carotid artery stenosis mult vessel < 50%  3. MI, anterior wall, follow-up  4. Dyspnea  5. Aneurysm, abdominal  6. DM, Type II  7. Edema, localized  8. History of PTCA  9. History of smoking  10. Hypercholesteremia, pure  11. Hypertension, benign  12. Pulmonary Fibrosis, Unspecified  13. Renal insufficiency, chronic  14. Varicose Veins Of Bilateral Lower Extremities With Other Complications  15. Varicose Veins Of Right Lower Extremity With Both Ulcer Of Calf And Inflammation    PRESCRIPTIONS:  02/03/17 *Furosemide           20MG      2 TABLETS EVERY MORNING  02/10/15 *Atenolol             25MG      1/2 TABLET DAILY.  02/20/17 Sildenafil Citrate    20MG      1 TABLET 3 TIMES DAILY.  03/08/16 Levemir FlexTouch     100UNIT/  as directed  03/08/16 MetFORMIN HCl         1000MG    1 daily  08/12/15 Pravastatin Sodium    10MG      one tablet daily  12/16/14 Vitamin D3            1000UNIT  two capsules daily  06/17/14 Fosamax               70MG      once weekly  06/17/14 Magnesium             250MG     two tablets daily  04/22/13 Clopidogrel Bisulfate 75MG      one tablet daily  04/22/13 Gabapentin            300MG     one capsule three times daily  10/03/11 Cyanocobalamin        1000MCG/  one injection monthly  08/24/09 Multivitamins                   1 TABLET DAILY.        Sofi Dangelo,  FIFI HERNANDEZ/mahad - DD: 03/01/2017 - DT: 03/04/2017 - Job ID: [id number]   C: Dr. Lauryn Garcia  C: Dr. Sukhwinder Zuleta         Yes

## 2023-04-25 NOTE — END OF VISIT
[] : Fellow [FreeTextEntry3] : As modified and discussed with patient\par MD GWENDOLYN Pham [facility]\par Associate Professor of Medicine\par [facility]\par

## 2023-04-25 NOTE — ASSESSMENT
[FreeTextEntry1] : #HP infection,m treated. Sxs resolved, will confirm eradication\par #Colon cancer screening, average risk. Normal Colon on 2/2023\par \par #SOB- concerning for infection, given severity of sxs instructed to go to the ED\par \par Plan:\par -HP stool ag (needs to stop OTC PPI for at least 4 weeks)\par -Colonoscopy in 10 years\par -sent to the ED for respiratory distress\par -RTC PRN\par \par Omari Gomes MD\par GI Fellow\par \par

## 2023-04-25 NOTE — REVIEW OF SYSTEMS
[Shortness Of Breath] : shortness of breath [Wheezing] : wheezing [Cough] : cough [SOB on Exertion] : shortness of breath during exertion [Negative] : Endocrine

## 2023-04-25 NOTE — ED PROVIDER NOTE - PROGRESS NOTE DETAILS
Misael Casarez MD, PGY-1: Patient reports improved breathing after nebulizer and steroid treatment. She is amenable to following up with an allergist and taking a short course of prednisone outpatient.

## 2023-04-25 NOTE — ED PROVIDER NOTE - NSFOLLOWUPINSTRUCTIONS_ED_ALL_ED_FT
Se le evaluó en el departamento de emergencia por alergias. Hemos determinado que usted no requiere más tratamiento dentro del hospital.    Recomendamos que hace seguimiento con crawford DOCTOR PRIMARIO para hablar de ana resultados lorenzo crawford visita.    Si usted tiene sintomas tales sarah nausea o vómito sin daryn, incapacidad para defecar, martin en las heces, sugerimos que regrese al departamento de emergencia de nuevo. Se le evaluó en el departamento de emergencia por alergias. Hemos determinado que usted no requiere más tratamiento dentro del hospital.    Recomendamos que hace seguimiento con un ESPECIALISTA DE ALERGIAS para hablar de ana resultados lorenzo crawford visita.    Se le ha dado ramesh receta para un estiroid. Es importante tomarlo kallie sarah indiquen las instrucciones.    Si usted tiene sintomas tales sarah falta de aire, fiebres altas, dolor del pecho sugerimos que regrese al departamento de emergencia de nuevo.

## 2023-04-25 NOTE — HISTORY OF PRESENT ILLNESS
[FreeTextEntry1] :  Illness\par Ms. Snow is a 61yo F with PMH of chronic cough, arthralgia who presents for CRC screening and abdominal bloating.\par \par Last visit on 2/7/23, HPI as noted below\par "Patient reports longstanding (over 20 years) symptoms of bloating, usually 30 min following a meal that lasts several hours. She denies significant heartburn or reflux. She endorses "gastritis" pain. She denies night time episodes, weight loss, blood in stools, dysphagia, odynophagia, nausea, vomiting,  early satiety.   The patient reports a remote (20 years ago) colonoscopy. Originally from [facility]. She has never tried PPIs. Not on NSAIDs. \par \par Her mother had ovarian cancer. There is no other family history of colon cancer, colon polyp, peptic ulcer disease, female genitourinary disease, liver disease, cholelithiasis, pancreatic disease or cancer, inflammatory bowel disease or celiac disease. "\par \par Underwent EGD/Colonoscopy on 2/23 with biopsy:  + HP, s/p quadruple therapy. Now feeling back to baseline. She denies more abd pain or sxs as noted below. Colonoscopy was also normal. \par \par Her main complaint today is difficulty breathing/wheezing and "bad allergies". She feels she has to sit up to sleep, short of breath while talking. \par

## 2023-04-25 NOTE — ED PROVIDER NOTE - RAPID ASSESSMENT
Patient is a 62-year-old female with no past medical history Presenting with cough, Sinus congestion itchy eyes Intermittent shortness of breath especially at night wheezing.  Patient has a history of seasonal allergies thinks that is why she is having the symptoms.  Patient has been taking Zyrtec at home without improvement. Patient is also complaining of chest pain.  Headache and back pain.  no sick contacts or fever.     *** I, Jun Plummer MD, performed an initial face to face bedside interview with this patient regarding history of present illness and determined that the patient should be evaluated in the main ED. A physical exam was not performed due to private space availability. This patient's evaluation is NOT COMPLETE and only preliminary. The full assessment, management, and reassessment of this patient, including but not limited to the follow up of ordered laboratory and radiologic testing, is deferred to the main ED provider. ***

## 2023-04-25 NOTE — ED PROVIDER NOTE - PATIENT PORTAL LINK FT
You can access the FollowMyHealth Patient Portal offered by [facility] by registering at the following website: [email or web address]. By joining [employer]’s FollowMyHealth portal, you will also be able to view your health information using other applications (apps) compatible with our system.

## 2023-04-25 NOTE — ED ADULT NURSE NOTE - OBJECTIVE STATEMENT
62-year-old female with no past medical history Presenting with cough, Sinus congestion itchy eyes Intermittent shortness of breath especially at night wheezing.  Patient has a history of seasonal allergies thinks that is why she is having the symptoms.  Patient has been taking Zyrtec at home without improvement. Patient also endorsing chest and back pain. Patient was seen by QDOC and QRN. Labs sent pending results.

## 2023-04-25 NOTE — ED PROVIDER NOTE - ATTENDING CONTRIBUTION TO CARE
Patient is a 62-year-old female, Turkmen-speaking, with a history of GERD, here for 8 days of cough associated with wheezing and itchy eyes.    Spoke to patient via a , Sendy, [id number]. Patient reports that she gets these symptoms every spring.  She reports that she has a 5-year smoking history of about 20 years ago.  She complains of chest tightness and wheezing.  She states that she tried to get an appointment with her primary care physician but was unable to.  She states that she had an appointment with her gastroenterologist and was told to come to the emergency department for evaluation.  Patient denies any fevers, nausea vomiting, diarrhea.    VS noted  Gen. no acute distress, coughing  HEENT: EOMI, mmm,   Lungs: diffuse wheezing  CVS: RRR   Abd; Soft non tender, non distended   Ext: no edema  Skin: no rash  Neuro AAOx3 non focal clear speech  a/p:  wheezing/reactive airway disease–likely secondary to seasonal allergies.  Patient reports that she has been taking Zyrtec without any relief.  She does say that she gets an inhaler and steroids every spring but it is difficult for her to obtain.  On exam, patient has significant expiratory wheezing.  She denies any fevers.  Plan for Decadron and nebs.  Will order inhaler.  Patient will likely need to be discharged with a steroid course.  We will also check x-ray.   EKG was done and is at a rate of 85 with a normal sinus rhythm with sinus arrhythmia.  There are no ST elevations or depressions.  - Jose MURPHY

## 2023-04-25 NOTE — PHYSICAL EXAM
[Normal] : heart rate was normal and rhythm regular, normal S1 and S2, no murmurs [Bowel Sounds] : normal bowel sounds [No CVA Tenderness] : no CVA  tenderness [Abnormal Walk] : normal gait [Oriented To Time, Place, And Person] : oriented to person, place, and time [de-identified] : increase work of breathing

## 2023-04-26 DIAGNOSIS — Z12.11 ENCOUNTER FOR SCREENING FOR MALIGNANT NEOPLASM OF COLON: ICD-10-CM

## 2023-04-26 DIAGNOSIS — R14.0 ABDOMINAL DISTENSION (GASEOUS): ICD-10-CM

## 2023-04-26 DIAGNOSIS — J80 ACUTE RESPIRATORY DISTRESS SYNDROME: ICD-10-CM

## 2023-04-26 DIAGNOSIS — A04.8 OTHER SPECIFIED BACTERIAL INTESTINAL INFECTIONS: ICD-10-CM

## 2023-05-05 ENCOUNTER — OUTPATIENT (OUTPATIENT)
Dept: OUTPATIENT SERVICES | Facility: HOSPITAL | Age: 63
LOS: 1 days | End: 2023-05-05
Payer: SELF-PAY

## 2023-05-05 ENCOUNTER — APPOINTMENT (OUTPATIENT)
Dept: RHEUMATOLOGY | Facility: HOSPITAL | Age: 63
End: 2023-05-05
Payer: COMMERCIAL

## 2023-05-05 VITALS
HEIGHT: 59 IN | WEIGHT: 120 LBS | SYSTOLIC BLOOD PRESSURE: 126 MMHG | RESPIRATION RATE: 14 BRPM | HEART RATE: 94 BPM | BODY MASS INDEX: 24.19 KG/M2 | DIASTOLIC BLOOD PRESSURE: 72 MMHG | TEMPERATURE: 98 F

## 2023-05-05 DIAGNOSIS — Z98.890 OTHER SPECIFIED POSTPROCEDURAL STATES: Chronic | ICD-10-CM

## 2023-05-05 DIAGNOSIS — M15.8 OTHER POLYOSTEOARTHRITIS: ICD-10-CM

## 2023-05-05 DIAGNOSIS — M06.9 RHEUMATOID ARTHRITIS, UNSPECIFIED: ICD-10-CM

## 2023-05-05 PROCEDURE — G0463: CPT

## 2023-05-05 PROCEDURE — ZZZZZ: CPT | Mod: GC

## 2023-05-05 RX ORDER — CELECOXIB 100 MG/1
100 CAPSULE ORAL TWICE DAILY
Qty: 10 | Refills: 0 | Status: DISCONTINUED | COMMUNITY
Start: 2023-02-08 | End: 2023-05-05

## 2023-05-07 PROBLEM — M15.8 OTHER OSTEOARTHRITIS INVOLVING MULTIPLE JOINTS: Status: ACTIVE | Noted: 2023-01-13

## 2023-05-07 NOTE — ASSESSMENT
[FreeTextEntry1] : 61 yo F referred for chronic joint pain of the hands and concern for inflammatory arthritis. Clinical hx and exam suggestive of Osteoarthritis (worse with repetitive use, lack of morning stiffness greater than an hour, no evidence of inflammation on exam, no improvement while on steroids in May 2022, inflammatory markers negative)\par \par #Chronic b/l hand pain L>R\par - c/w Voltaren gel daily\par - c/w Tylenol Arthritis daily\par \par # R shoulder pain (chronic - trauma induced) likely OA component as well\par - s/p R shoulder injection for bicipital tendinitis October 2022\par - c/w Voltaren gel prn\par \par #R foot pain (hammer toe of 2nd digit causing pain and skin ulceration due to hallux valgus deformity)\par - Podiatry recs appreciated; f/u as needed\par \par #Bone Health\par - Normal BMD March 2023\par \par # Cough; Seasonal Allergies, recent ED visit\par - referral to Allergy for control of Allergies/Asthma\par \par no f/u needed unless experiencing severe joint pain or new joint swelling\par \par d/w Dr. Perales

## 2023-05-07 NOTE — REASON FOR VISIT
[Follow-Up: _____] : a [unfilled] follow-up visit [FreeTextEntry1] : OA [Interpreters_IDNumber] : [id number] [TWNoteComboBox1] : [place]ican

## 2023-05-07 NOTE — PHYSICAL EXAM
[General Appearance - Alert] : alert [General Appearance - In No Acute Distress] : in no acute distress [Sclera] : the sclera and conjunctiva were normal [Outer Ear] : the ears and nose were normal in appearance [Neck Appearance] : the appearance of the neck was normal [Auscultation Breath Sounds / Voice Sounds] : lungs were clear to auscultation bilaterally [Heart Rate And Rhythm] : heart rate was normal and rhythm regular [Heart Sounds] : normal S1 and S2 [Edema] : there was no peripheral edema [Abdomen Soft] : soft [Abdomen Tenderness] : non-tender [Cervical Lymph Nodes Enlarged Posterior Bilaterally] : posterior cervical [Cervical Lymph Nodes Enlarged Anterior Bilaterally] : anterior cervical [Supraclavicular Lymph Nodes Enlarged Bilaterally] : supraclavicular [No Spinal Tenderness] : no spinal tenderness [] : no rash [No Focal Deficits] : no focal deficits [FreeTextEntry1] : Heberden nodes present; MCP hypertrophy of 1-3 joints without swelling and with mild tenderness to palpation;  squaring of CMC joints b/l; Hammer toe with skin ulceration on R 2nd digit with hallux valgus deformity

## 2023-05-07 NOTE — HISTORY OF PRESENT ILLNESS
[FreeTextEntry1] : 60 yo F referred for chronic joint pain of the hands and concern for inflammatory arthritis\par \par # Hand pain for approximately 5 years and progressively worse over the last few months.\par Involvement are MCP b/l 1-3. L>R. Morning stiffness lasts less than 20 minutes. Resolves after taking hot shower. Pain is worse at the end of the day after doing repetitive tasks (she is a ). Reports joint swelling.  Symptom control with Voltaren gel and Tylenol sparingly. Reports only mild improvement with these medications. \par Last May, she was on a Prednisone taper starting at 40 mg daily for asthma exacerbation and did not notice any change to her arthritis. \par \par SLE ROS negative except for dry mouth. Spondyloarthropathy ROS negative. \par \par # R shoulder pain: Started 10 years ago after trauma. Did not ever do physical therapy\par \par # R toe pain: Chronic issue. s/p R halllux valgus sx, but still with incorrect alignment causing hammer toe of 2nd digit and rubbing of skin against shoe\par \par Relevant Medical Hx: Reported Gastritis\par Pregnancy Hx: 3 full term pregnancies without complications and 1 miscarriage at 3 weeks\par \par Prior workup: JOHNNY negative, RF negative, ESR and CRP negative\par \par L Hand Xray Oct 2022: 3rd DIP OA changes.

## 2023-05-08 DIAGNOSIS — R05.9 COUGH, UNSPECIFIED: ICD-10-CM

## 2023-05-08 DIAGNOSIS — M15.8 OTHER POLYOSTEOARTHRITIS: ICD-10-CM

## 2023-05-09 ENCOUNTER — EMERGENCY (EMERGENCY)
Facility: HOSPITAL | Age: 63
LOS: 1 days | Discharge: HOME | End: 2023-05-09
Attending: STUDENT IN AN ORGANIZED HEALTH CARE EDUCATION/TRAINING PROGRAM
Payer: MEDICAID

## 2023-05-09 VITALS
TEMPERATURE: 99 F | RESPIRATION RATE: 16 BRPM | HEART RATE: 78 BPM | OXYGEN SATURATION: 97 % | SYSTOLIC BLOOD PRESSURE: 120 MMHG | DIASTOLIC BLOOD PRESSURE: 60 MMHG

## 2023-05-09 VITALS
DIASTOLIC BLOOD PRESSURE: 84 MMHG | HEART RATE: 93 BPM | SYSTOLIC BLOOD PRESSURE: 130 MMHG | WEIGHT: 119.93 LBS | TEMPERATURE: 99 F | RESPIRATION RATE: 18 BRPM | OXYGEN SATURATION: 96 % | HEIGHT: 63 IN

## 2023-05-09 DIAGNOSIS — Z98.890 OTHER SPECIFIED POSTPROCEDURAL STATES: Chronic | ICD-10-CM

## 2023-05-09 PROCEDURE — 99284 EMERGENCY DEPT VISIT MOD MDM: CPT

## 2023-05-09 PROCEDURE — 99283 EMERGENCY DEPT VISIT LOW MDM: CPT | Mod: 25

## 2023-05-09 PROCEDURE — 71046 X-RAY EXAM CHEST 2 VIEWS: CPT | Mod: 26

## 2023-05-09 PROCEDURE — 0225U NFCT DS DNA&RNA 21 SARSCOV2: CPT

## 2023-05-09 PROCEDURE — 71046 X-RAY EXAM CHEST 2 VIEWS: CPT

## 2023-05-09 RX ORDER — ACETAMINOPHEN 500 MG
975 TABLET ORAL ONCE
Refills: 0 | Status: COMPLETED | OUTPATIENT
Start: 2023-05-09 | End: 2023-05-09

## 2023-05-09 RX ORDER — HYDROCODONE BITARTRATE AND HOMATROPINE METHYLBROMIDE 5; 1.5 MG/5ML; MG/5ML
5 SOLUTION ORAL
Qty: 45 | Refills: 0
Start: 2023-05-09 | End: 2023-05-11

## 2023-05-09 NOTE — ED PROVIDER NOTE - NSICDXNOPASTMEDICALHX_GEN_ALL_ED
L/m for Long to inform him that writer has scheduled his CT scan for 4.13.  NPO 3 hrs prior.  Pt to call writer with questions/concerns.       <-- Click to add NO pertinent Past Medical History

## 2023-05-09 NOTE — ED PROVIDER NOTE - PROGRESS NOTE DETAILS
Will f/u with PMD  Has appt Friday with Ophtho and allergist 6/2  Patient stable for discharge.  Follow up instructions given, return to ED precautions reviewed. Importance of follow up emphasized, patient verbalized understanding.  All questions answered. Rosanna Lunsford PA-C

## 2023-05-09 NOTE — ED ADULT NURSE NOTE - OBJECTIVE STATEMENT
Pt is 63 y/o female, presenting to the ED c/o environmental allergies. Pt denies pertinent PMH, endorses non-productive cough, congestion, sore throat, and itchy eyes since 4/25. Pt was recently seend and d/c'ed in ED w/ negative RVP. Pt reports she felt a little better, but symptoms have returned. Pt tolerating PO. Denies SOB, n/v/d, fever, chills, chest pain, SOB, HA, and dizziness.

## 2023-05-09 NOTE — ED PROVIDER NOTE - CLINICAL SUMMARY MEDICAL DECISION MAKING FREE TEXT BOX
Kenny Mohan MD (Attending Physician): 62F with known allergies presents for 2 week history of dry cough, congestion, sore throat, watery and itchy eyes and body aches. Patient was seen in ED 2 wks ago, had breathing tx, steroids given and sent home. Also was sent on home on allergy eye drops. Patient reports seeing PMD but next appointment in July which was too long. Does not have an allergist and not on any other home medications except for what was given 2 weeks ago.     On exam,  Const: Cooperative, in NAD  HEENT: Head is normocephalic, atraumatic. PERRLA. EOMI. Conjunctival hemorrhage on the right.   Lungs:clear to auscultation bilaterally. No wheezes, crackles, rhonchi   Cardiovascular: RRR, no murmurs, rubs or gallops.  Abdomen: No scars. No distension. Soft and nontender. No rebound, guarding or masses noted.  MSK: No pitting edema, erythema, or cyanosis. Full ROM in all extremities   Neuro:Awake, AOx3, follows commands    Differentials include but are not limited to: viral URI, pneumonia, allergic rhinitis, COVID. Plan for labs, RVP, meds and reassess. Will likely be discharged home with antihistamines for relief f/u with PMD/allergist.

## 2023-05-09 NOTE — ED PROVIDER NOTE - PATIENT PORTAL LINK FT
You can access the FollowMyHealth Patient Portal offered by [facility] by registering at the following website: [email or web address]. By joining [employer]’s FollowMyHealth portal, you will also be able to view your health information using other applications (apps) compatible with our system.

## 2023-05-09 NOTE — ED PROVIDER NOTE - PHYSICAL EXAMINATION
CONSTITUTIONAL: Well appearing and in no apparent distress.  ENT: Airway patent, moist mucous membranes. Oropharynx clear. Tonsils w/o swelling. No exudates.   EYES: Pupils equal, round and reactive to light. EOMI. Conjunctiva mildly injected bilaterally. No eyelid swelling. No discharge noted.   CARDIAC: Normal rate, regular rhythm.  Heart sounds S1, S2.    RESPIRATORY: Breath sounds clear and equal bilaterally. No wheezing.   GASTROINTESTINAL: Abdomen soft, non-tender, not distended.  MUSCULOSKELETAL: Spine appears normal.  NEUROLOGICAL: Alert and oriented x3, no focal deficits, no motor or sensory deficits. 5/5 muscle strength throughout.  SKIN: Skin normal color, warm, dry and intact.   PSYCHIATRIC: Normal mood and affect.

## 2023-05-09 NOTE — ED PROVIDER NOTE - NS ED ATTENDING STATEMENT MOD
This was a shared visit with the TELMA. I reviewed and verified the documentation and independently performed the documented:

## 2023-05-09 NOTE — ED PROVIDER NOTE - OBJECTIVE STATEMENT
61 yo F with no reported PMH p/w non prod cough, congestion, sore throat, watery/itchy eyes, body aches and malaise since 04/25. Pt was seen in ED on that day, had negative RVP and negative CXR. Was given nebs and steroids. States she felt a little bit better until sxs worsened again 1 week ago. Has PMD but has not gone in for eval. Denies nausea, vomiting, fever, chills, chest pain, SOB, abd pain, diarrhea, headache, dizziness. Eating/drinking normally.

## 2023-05-09 NOTE — ED PROVIDER NOTE - NSFOLLOWUPINSTRUCTIONS_ED_ALL_ED_FT
Asegúrese de hacer un seguimiento con crawford médico de atención primaria dentro de 1 a 2 días.  Lleve consigo ramesh copia de todos ana resultados a ana citas de seguimiento.  Regrese a la eddi de emergencias sarah se indicó si presenta síntomas nuevos o que empeoran.      Puede irish Tylenol 1000 mg e ibuprofeno 400 mg cada 6 horas según sea necesario para el dolor. Maunie ibuprofeno con alimentos.  Maunie el medicamento para la tos según sea necesario para la tos.

## 2023-05-09 NOTE — ED PROVIDER NOTE - ATTENDING APP SHARED VISIT CONTRIBUTION OF CARE
I, Kenny Mohan, performed a history and physical exam of the patient and discussed their management with the advanced care provider. I reviewed the note and agree with the documented findings and plan of care. I was present and available for all procedures.    ---    Please see MDM

## 2023-05-10 LAB
RAPID RVP RESULT: SIGNIFICANT CHANGE UP
SARS-COV-2 RNA SPEC QL NAA+PROBE: SIGNIFICANT CHANGE UP

## 2023-05-26 ENCOUNTER — APPOINTMENT (OUTPATIENT)
Dept: INTERNAL MEDICINE | Facility: CLINIC | Age: 63
End: 2023-05-26
Payer: COMMERCIAL

## 2023-05-26 VITALS
HEIGHT: 59 IN | BODY MASS INDEX: 23.59 KG/M2 | DIASTOLIC BLOOD PRESSURE: 82 MMHG | WEIGHT: 117 LBS | OXYGEN SATURATION: 99 % | HEART RATE: 80 BPM | SYSTOLIC BLOOD PRESSURE: 124 MMHG

## 2023-05-26 DIAGNOSIS — H10.10 ACUTE ATOPIC CONJUNCTIVITIS, UNSPECIFIED EYE: ICD-10-CM

## 2023-05-26 PROCEDURE — ZZZZZ: CPT

## 2023-05-29 PROBLEM — H10.10 ALLERGIC CONJUNCTIVITIS: Status: ACTIVE | Noted: 2022-05-13

## 2023-05-29 NOTE — PHYSICAL EXAM
[No Acute Distress] : no acute distress [Well-Appearing] : well-appearing [Normal Sclera/Conjunctiva] : normal sclera/conjunctiva [EOMI] : extraocular movements intact [Normal Outer Ear/Nose] : the outer ears and nose were normal in appearance [Normal Oropharynx] : the oropharynx was normal [No JVD] : no jugular venous distention [Supple] : supple [No Respiratory Distress] : no respiratory distress  [No Accessory Muscle Use] : no accessory muscle use [Clear to Auscultation] : lungs were clear to auscultation bilaterally [Normal Rate] : normal rate  [Regular Rhythm] : with a regular rhythm [Normal S1, S2] : normal S1 and S2 [Soft] : abdomen soft [Non Tender] : non-tender [Non-distended] : non-distended [Normal Posterior Cervical Nodes] : no posterior cervical lymphadenopathy [Normal Anterior Cervical Nodes] : no anterior cervical lymphadenopathy [No Joint Swelling] : no joint swelling [Grossly Normal Strength/Tone] : grossly normal strength/tone [No Rash] : no rash [No Focal Deficits] : no focal deficits [Normal Gait] : normal gait [Normal Affect] : the affect was normal [Normal Insight/Judgement] : insight and judgment were intact

## 2023-06-02 ENCOUNTER — APPOINTMENT (OUTPATIENT)
Dept: PEDIATRIC ALLERGY IMMUNOLOGY | Facility: CLINIC | Age: 63
End: 2023-06-02
Payer: COMMERCIAL

## 2023-06-02 ENCOUNTER — NON-APPOINTMENT (OUTPATIENT)
Age: 63
End: 2023-06-02

## 2023-06-02 ENCOUNTER — LABORATORY RESULT (OUTPATIENT)
Age: 63
End: 2023-06-02

## 2023-06-02 VITALS
WEIGHT: 117 LBS | HEIGHT: 59 IN | TEMPERATURE: 97.7 F | BODY MASS INDEX: 23.59 KG/M2 | HEART RATE: 76 BPM | SYSTOLIC BLOOD PRESSURE: 134 MMHG | OXYGEN SATURATION: 97 % | DIASTOLIC BLOOD PRESSURE: 77 MMHG

## 2023-06-02 DIAGNOSIS — Z01.82 ENCOUNTER FOR ALLERGY TESTING: ICD-10-CM

## 2023-06-02 DIAGNOSIS — Z87.09 PERSONAL HISTORY OF OTHER DISEASES OF THE RESPIRATORY SYSTEM: ICD-10-CM

## 2023-06-02 DIAGNOSIS — R05.9 COUGH, UNSPECIFIED: ICD-10-CM

## 2023-06-02 DIAGNOSIS — J31.0 CHRONIC RHINITIS: ICD-10-CM

## 2023-06-02 PROCEDURE — 99204 OFFICE O/P NEW MOD 45 MIN: CPT

## 2023-06-02 NOTE — REVIEW OF SYSTEMS
[Eye Redness] : redness [Eye Itching] : itchy eyes [Post Nasal Drip] : post nasal drip [Difficulty Breathing] : dyspnea [Cough] : cough [Nl] : Genitourinary [FreeTextEntry4] : fullness in sinuses

## 2023-06-02 NOTE — HISTORY OF PRESENT ILLNESS
[de-identified] : 62 year old presented for initial evaluation of allergy. \par \par allergies for 3 years.\par - SOB, chest congestion, cough, "inflammation of respiratory tract"\par - no congestion, runny nose \par - itchy eyes\par - also diagnosed with a lung infection and bronchitis once a year for 3 years. \par - was following with pulmonary, testing was all normal. Last visit was last year. \par \par - takes Zyrtec once a day \par - uses an eye drop prescribed by an ophthalmologist \par - Benadryl once or twice a day (took it today)\par - Flonase did help in the past. not using it anymore.  \par - symptoms worse when walking and with AC\par - always in the Spring \par \par - former smoker\par - for 5 years\par - quit 40 years ago\par - smoked 3 cigarettes/day\par \par - no history of asthma or other lung disease

## 2023-06-02 NOTE — SOCIAL HISTORY
[Apartment] : [unfilled] lives in an apartment  [Length of Occupancy (yrs)___] : the length of occupancy is [unfilled] years [Radiator/Baseboard] : heating provided by radiator(s)/baseboard(s) [Central] : air conditioning provided by central unit [Dust Mite Covers] : has dust mite covers [None] : none [Cockroaches] : Patient states that there are no cockroaches in the home [Bedroom] : not in the bedroom [Living Area] : not in the living area [Smokers in Household] : there are no smokers in the home

## 2023-06-02 NOTE — CONSULT LETTER
[Dear  ___] : Dear  [unfilled], [Consult Letter:] : I had the pleasure of evaluating your patient, [unfilled]. [Please see my note below.] : Please see my note below. [Consult Closing:] : Thank you very much for allowing me to participate in the care of this patient.  If you have any questions, please do not hesitate to contact me. [Sincerely,] : Sincerely, [FreeTextEntry3] : Hyacinth Hackett MD\par Fellow, Division of Allergy and Immunology.

## 2023-06-02 NOTE — REASON FOR VISIT
[Initial Consultation] : an initial consultation for [[employer] ] : provided by [employer]   [FreeTextEntry2] : allergy [Interpreters_IDNumber] : [id number] [TWNoteComboBox1] : Nigerien

## 2023-06-02 NOTE — END OF VISIT
[] : Fellow [FreeTextEntry3] : Agree with above. May use Albuterol PRN (some evidence of obstruction on prior flow volume loop done by pulmonary).

## 2023-06-05 LAB
A ALTERNATA IGE QN: <0.1 KUA/L
A FUMIGATUS IGE QN: <0.1 KUA/L
AMER BEECH IGE QN: 0
BOXELDER IGE QN: <0.1 KUA/L
BUDGERIGAR FEATHER (E78) CLASS: 0
BUDGERIGAR FEATHER (E78) CONC: <0.1 KUA/L
C HERBARUM IGE QN: <0.1 KUA/L
C LUNATA IGE QN: <0.1 KUA/L
CAT DANDER IGE QN: <0.1 KUA/L
CHICKEN FEATHER IGE QN: <0.1 KUA/L
CMN PIGWEED IGE QN: 0.14 KUA/L
COCKLEBUR IGE QN: <0.1 KUA/L
COCKSFOOT IGE QN: 3.69 KUA/L
COMMON RAGWEED IGE QN: 0.27 KUA/L
COTTONWOOD IGE QN: <0.1 KUA/L
D FARINAE IGE QN: <0.1 KUA/L
D PTERONYSS IGE QN: <0.1 KUA/L
DEPRECATED A ALTERNATA IGE RAST QL: 0
DEPRECATED A FUMIGATUS IGE RAST QL: 0
DEPRECATED A PULLULANS IGE RAST QL: 0
DEPRECATED AMER BEECH IGE RAST QL: <0.1 KUA/L
DEPRECATED BOXELDER IGE RAST QL: 0
DEPRECATED C HERBARUM IGE RAST QL: 0
DEPRECATED C LUNATA IGE RAST QL: 0
DEPRECATED CAT DANDER IGE RAST QL: 0
DEPRECATED CHICKEN FEATHER IGE RAST QL: 0
DEPRECATED COCKLEBUR IGE RAST QL: 0
DEPRECATED COCKSFOOT IGE RAST QL: 3
DEPRECATED COMMON PIGWEED IGE RAST QL: NORMAL
DEPRECATED COMMON RAGWEED IGE RAST QL: NORMAL
DEPRECATED COTTONWOOD IGE RAST QL: 0
DEPRECATED D FARINAE IGE RAST QL: 0
DEPRECATED D PTERONYSS IGE RAST QL: 0
DEPRECATED DOG DANDER IGE RAST QL: 0
DEPRECATED DUCK FEATHER IGE RAST QL: 0
DEPRECATED ENGL PLANTAIN IGE RAST QL: 0
DEPRECATED F MONILIFORME IGE RAST QL: 0
DEPRECATED GIANT RAGWEED IGE RAST QL: 0
DEPRECATED GOOSE FEATHER IGE RAST QL: 0
DEPRECATED GOOSE FEATHER IGE RAST QL: 0
DEPRECATED GOOSEFOOT IGE RAST QL: NORMAL
DEPRECATED KENT BLUE GRASS IGE RAST QL: 3
DEPRECATED LONDON PLANE IGE RAST QL: 0
DEPRECATED MUGWORT IGE RAST QL: 0
DEPRECATED P NOTATUM IGE RAST QL: 0
DEPRECATED R NIGRICANS IGE RAST QL: 0
DEPRECATED RED CEDAR IGE RAST QL: 0
DEPRECATED RED TOP GRASS IGE RAST QL: 2
DEPRECATED ROACH IGE RAST QL: 0
DEPRECATED RYE IGE RAST QL: 3
DEPRECATED SALTWORT IGE RAST QL: 0
DEPRECATED SILVER BIRCH IGE RAST QL: NORMAL
DEPRECATED TIMOTHY IGE RAST QL: 2
DEPRECATED WHITE ASH IGE RAST QL: 0
DEPRECATED WHITE HICKORY IGE RAST QL: 2
DEPRECATED WHITE OAK IGE RAST QL: NORMAL
DOG DANDER IGE QN: <0.1 KUA/L
DUCK FEATHER IGE QN: <0.1 KUA/L
ENGL PLANTAIN IGE QN: <0.1 KUA/L
F MONILIFORME IGE QN: <0.1 KUA/L
GIANT RAGWEED IGE QN: <0.1 KUA/L
GOOSE FEATHER IGE QN: <0.1 KUA/L
GOOSE FEATHER IGE QN: <0.1 KUA/L
GOOSEFOOT IGE QN: 0.11 KUA/L
GRAY ALDER (T2) CLASS: 1
GRAY ALDER (T2) CONC: 0.62 KUA/L
IGE SER-MCNC: 36 KU/L
KENT BLUE GRASS IGE QN: 4.02 KUA/L
LONDON PLANE IGE QN: <0.1 KUA/L
MOLD (AUREOBASIDIUM M12) CONC: <0.1 KUA/L
MUGWORT IGE QN: <0.1 KUA/L
MULBERRY (T70) CLASS: 0
MULBERRY (T70) CONC: <0.1 KUA/L
P NOTATUM IGE QN: <0.1 KUA/L
R NIGRICANS IGE QN: <0.1 KUA/L
RED CEDAR IGE QN: <0.1 KUA/L
RED TOP GRASS IGE QN: 2.74 KUA/L
ROACH IGE QN: <0.1 KUA/L
RYE IGE QN: 3.57 KUA/L
SALTWORT IGE QN: <0.1 KUA/L
SILVER BIRCH IGE QN: 0.1 KUA/L
TIMOTHY IGE QN: 3.21 KUA/L
WHITE ASH IGE QN: <0.1 KUA/L
WHITE ELM IGE QN: 0.38 KUA/L
WHITE ELM IGE QN: 1
WHITE HICKORY IGE QN: 1.05 KUA/L
WHITE OAK IGE QN: 0.15 KUA/L

## 2023-06-16 ENCOUNTER — OUTPATIENT (OUTPATIENT)
Dept: OUTPATIENT SERVICES | Facility: HOSPITAL | Age: 63
LOS: 1 days | End: 2023-06-16
Payer: SELF-PAY

## 2023-06-16 DIAGNOSIS — Z98.890 OTHER SPECIFIED POSTPROCEDURAL STATES: Chronic | ICD-10-CM

## 2023-06-16 DIAGNOSIS — Z12.11 ENCOUNTER FOR SCREENING FOR MALIGNANT NEOPLASM OF COLON: ICD-10-CM

## 2023-06-16 DIAGNOSIS — J80 ACUTE RESPIRATORY DISTRESS SYNDROME: ICD-10-CM

## 2023-06-16 DIAGNOSIS — A04.8 OTHER SPECIFIED BACTERIAL INTESTINAL INFECTIONS: ICD-10-CM

## 2023-06-16 DIAGNOSIS — R14.0 ABDOMINAL DISTENSION (GASEOUS): ICD-10-CM

## 2023-06-16 PROCEDURE — 87338 HPYLORI STOOL AG IA: CPT

## 2023-06-17 LAB — H PYLORI AG STL QL: NEGATIVE — SIGNIFICANT CHANGE UP

## 2023-07-10 NOTE — ASU DISCHARGE PLAN (ADULT/PEDIATRIC) - ACCOMPANIED BY
Quality 226: Preventive Care And Screening: Tobacco Use: Screening And Cessation Intervention: Tobacco Screening not Performed for Unknown Reasons Quality 337: Tuberculosis Prevention For Psoriasis And Psoriatic Arthritis Patients On A Biological Immune Response Modifier: No documentation of negative or managed positive TB screen Detail Level: Detailed Quality 130: Documentation Of Current Medications In The Medical Record: Current Medications Documented Quality 137: Melanoma: Continuity Of Care - Recall System: Recall system not utilized, reason not otherwise specified Quality 402: Tobacco Use And Help With Quitting Among Adolescents: Tobacco Screening OR Tobacco Cessation Intervention not Performed Reason Not Otherwise Specified Quality 431: Preventive Care And Screening: Unhealthy Alcohol Use - Screening: Unhealthy alcohol use screening not performed, reason not otherwise specified Quality 410: Psoriasis Clinical Response To Oral Systemic Or Biologic Mediations: Psoriasis Assessment Tool NOT Documented Family Quality 47: Advance Care Plan: Advance care planning not documented, reason not otherwise specified. Quality 138: Melanoma: Coordination Of Care: Treatment plan not communicated, reason not otherwise specified.

## 2023-07-18 ENCOUNTER — NON-APPOINTMENT (OUTPATIENT)
Age: 63
End: 2023-07-18

## 2023-08-04 ENCOUNTER — APPOINTMENT (OUTPATIENT)
Dept: PEDIATRIC ALLERGY IMMUNOLOGY | Facility: CLINIC | Age: 63
End: 2023-08-04
Payer: COMMERCIAL

## 2023-08-04 VITALS
SYSTOLIC BLOOD PRESSURE: 130 MMHG | DIASTOLIC BLOOD PRESSURE: 71 MMHG | TEMPERATURE: 98.2 F | OXYGEN SATURATION: 96 % | HEART RATE: 95 BPM

## 2023-08-04 DIAGNOSIS — J30.1 ALLERGIC RHINITIS DUE TO POLLEN: ICD-10-CM

## 2023-08-04 PROCEDURE — 99213 OFFICE O/P EST LOW 20 MIN: CPT

## 2023-08-04 RX ORDER — FLUTICASONE PROPIONATE 50 UG/1
50 SPRAY, METERED NASAL
Qty: 1 | Refills: 2 | Status: ACTIVE | COMMUNITY
Start: 2023-06-02 | End: 1900-01-01

## 2023-08-04 RX ORDER — CETIRIZINE HYDROCHLORIDE 10 MG/1
10 TABLET, COATED ORAL
Qty: 1 | Refills: 2 | Status: ACTIVE | COMMUNITY
Start: 2023-06-02 | End: 1900-01-01

## 2023-08-04 RX ORDER — AZELASTINE HYDROCHLORIDE 137 UG/1
137 SPRAY, METERED NASAL TWICE DAILY
Qty: 1 | Refills: 3 | Status: ACTIVE | COMMUNITY
Start: 2023-06-02 | End: 1900-01-01

## 2023-08-04 NOTE — HISTORY OF PRESENT ILLNESS
[de-identified] : 62 year old female with allergic rhinitis here for follow up.  ALLERGIC RHINITIS: ImmunoCAP positive to trees, grasses She reports that in the spring time she gets nasal congestion, chest congestion, eye itchiness. Is asymptomatic during the other seasons. She takes zyrtec which helps. Has been off zyrtec recently since her symptoms have resolved. Today she is interested in getting an "allergy shot." She is leaving for [place], [place] later this month and needs something to take care of her allergies. However shots were never fully explained to her.    INITIAL HISTORY: allergies for 3 years.  - SOB, chest congestion, cough, "inflammation of respiratory tract"  - no congestion, runny nose  - itchy eyes  - also diagnosed with a lung infection and bronchitis once a year for 3 years.  - was following with pulmonary, testing was all normal. Last visit was last year.    - takes Zyrtec once a day  - uses an eye drop prescribed by an ophthalmologist  - Benadryl once or twice a day (took it today)  - Flonase did help in the past. not using it anymore.  - symptoms worse when walking and with AC  - always in the Spring

## 2023-08-04 NOTE — REASON FOR VISIT
[Routine Follow-Up] : a routine follow-up visit for [Allergy Evaluation/ Skin Testing] : allergy evaluation and or skin testing [[employer] ] : provided by [employer]   [Time Spent: ____ minutes] : Total time spent using  services: [unfilled] minutes. The patient's primary language is not English thus required  services. [Source: ______] : History obtained from [unfilled] [Interpreters_IDNumber] : [id number]

## 2023-08-07 NOTE — PLAN
[FreeTextEntry1] : #Seasonal allergies\par - Continue current medications\par - Noted prior pulmonary and GI follow up\par - Follow up with Allergy as scheduled\par \par #H pylori\par Pending repeat stool sample for confirmation of eradication\par - Patient unwilling to stop PPI for repeat stool sample citing PPI helps with "gastritis" from allergy medications. Willing to consider after Allergy follow up\par \par Katharina Yen MD\par PGY3 Medicine

## 2023-08-07 NOTE — INTERPRETER SERVICES
[[employer] ] : provided by [employer]   [Interpreters_IDNumber] : [id number] [Interpreters_FullName] : Johanna [TWNoteComboBox1] : Bhutanese

## 2023-08-07 NOTE — ASSESSMENT
[FreeTextEntry1] : 62 year old woman with PMH inflammatory arthritis, seasonal allergies, H pylori (2/23 EGD) s/p quadruple therapy without confirmation of eradication, presenting for follow up of allergies

## 2023-08-07 NOTE — HISTORY OF PRESENT ILLNESS
[Cough] : cough [FreeTextEntry8] : Patient is a 62 year old woman with PMH inflammatory arthritis, seasonal allergies, H pylori (2/23 EGD) s/p quadruple therapy without confirmation of eradication, presenting for follow up of allergies. Patient states made appointment in April, which is when her symptoms are very severe. States always occurs in Spring and only started 3 years ago when she first came to the [place] from [facility]. Reports feels eyes are swollen and "[throat and lungs] are inflamed." Has had Pulmonology follow up last year, with unremarkable PFTs, deemed no need for inhaler therapy. \par \par Patient saw GI on 4/25 for follow up of gastritis but at that appointment was noted to be in respiratory distress and advised to go to the ED. Was given a course of nebulizers and steroids with some improvement but later again returned to ED for similar symptoms and again prescribed steroids. \par \par She takes three medications for allergies (morning and afternoon) but can not recall the names. Reports symptoms well controlled currently but concerned will again recur next year.

## 2024-02-09 NOTE — ED ADULT NURSE NOTE - NSSEPSISNEWALTERMENTAL_ED_A_ED
pre-screened covid 19    diagnoses: chronic peripheral venous hypertension with lower extremity complication, stasis dermatitis, chronic resp failure      Plan for next visit: gait training, transfer training, HEP education.
No

## 2024-05-08 NOTE — ED PROVIDER NOTE - IV ALTEPLASE ADMIN OUTSIDE HIDDEN
The recording was initiated after a verbal consent was obtained from the patient to record this visit for documentation in their clinical record.   show

## 2024-09-17 NOTE — PHYSICAL EXAM
HCC coding opportunities          Chart Reviewed number of suggestions sent to Provider: 1  [id number]     Patients Insurance        Commercial Insurance: [employer]        [Alert] : alert [Well Nourished] : well nourished [Healthy Appearance] : healthy appearance [No Acute Distress] : no acute distress [Well Developed] : well developed [Normal Pupil & Iris Size/Symmetry] : normal pupil and iris size and symmetry [No Discharge] : no discharge [No Photophobia] : no photophobia [Sclera Not Icteric] : sclera not icteric [Normal TMs] : both tympanic membranes were normal [Normal Nasal Mucosa] : the nasal mucosa was normal [Normal Lips/Tongue] : the lips and tongue were normal [Normal Outer Ear/Nose] : the ears and nose were normal in appearance [Normal Tonsils] : normal tonsils [No Thrush] : no thrush [Pale mucosa] : pale mucosa [Boggy Nasal Turbinates] : boggy and/or pale nasal turbinates [Supple] : the neck was supple [Normal Rate and Effort] : normal respiratory rhythm and effort [No Crackles] : no crackles [No Retractions] : no retractions [Bilateral Audible Breath Sounds] : bilateral audible breath sounds [Normal Rate] : heart rate was normal  [Normal S1, S2] : normal S1 and S2 [No murmur] : no murmur [Regular Rhythm] : with a regular rhythm [Normal Cervical Lymph Nodes] : cervical [Skin Intact] : skin intact  [No Rash] : no rash [No Skin Lesions] : no skin lesions [No clubbing] : no clubbing [No Edema] : no edema [No Cyanosis] : no cyanosis [Normal Mood] : mood was normal [Normal Affect] : affect was normal [Alert, Awake, Oriented as Age-Appropriate] : alert, awake, oriented as age appropriate

## (undated) DEVICE — PACK IV START WITH CHG

## (undated) DEVICE — TUBING SUCTION 20FT

## (undated) DEVICE — FORCEP RADIAL JAW 4 JUMBO 2.8MM 3.2MM 240CM ORANGE DISP

## (undated) DEVICE — SENSOR O2 FINGER ADULT

## (undated) DEVICE — CATH IV SAFE BC 20G X 1.16" (PINK)

## (undated) DEVICE — BRUSH COLONOSCOPY CYTOLOGY

## (undated) DEVICE — BIOPSY FORCEP RADIAL JAW 4 STANDARD WITH NEEDLE

## (undated) DEVICE — ELCTR GROUNDING PAD ADULT COVIDIEN

## (undated) DEVICE — CATH IV SAFE BC 22G X 1" (BLUE)

## (undated) DEVICE — TUBING IV SET GRAVITY 3Y 100" MACRO

## (undated) DEVICE — FOLEY HOLDER STATLOCK 2 WAY ADULT

## (undated) DEVICE — SYR LUER LOK 50CC

## (undated) DEVICE — TUBING SUCTION CONN 6FT STERILE

## (undated) DEVICE — SUCTION YANKAUER NO CONTROL VENT

## (undated) DEVICE — SYR ALLIANCE II INFLATION 60ML

## (undated) DEVICE — CLAMP BX HOT RAD JAW 3

## (undated) DEVICE — BITE BLOCK ADULT 20 X 27MM (GREEN)

## (undated) DEVICE — IRRIGATOR BIO SHIELD

## (undated) DEVICE — SOL INJ NS 0.9% 500ML 2 PORT

## (undated) DEVICE — POLY TRAP ETRAP

## (undated) DEVICE — BALLOON US ENDO